# Patient Record
Sex: FEMALE | Race: WHITE | NOT HISPANIC OR LATINO
[De-identification: names, ages, dates, MRNs, and addresses within clinical notes are randomized per-mention and may not be internally consistent; named-entity substitution may affect disease eponyms.]

---

## 2022-02-07 ENCOUNTER — NON-APPOINTMENT (OUTPATIENT)
Age: 57
End: 2022-02-07

## 2022-02-28 PROBLEM — Z00.00 ENCOUNTER FOR PREVENTIVE HEALTH EXAMINATION: Status: ACTIVE | Noted: 2022-02-28

## 2022-03-11 ENCOUNTER — APPOINTMENT (OUTPATIENT)
Dept: ORTHOPEDIC SURGERY | Facility: CLINIC | Age: 57
End: 2022-03-11
Payer: MEDICAID

## 2022-03-11 ENCOUNTER — NON-APPOINTMENT (OUTPATIENT)
Age: 57
End: 2022-03-11

## 2022-03-11 VITALS
SYSTOLIC BLOOD PRESSURE: 166 MMHG | HEIGHT: 64 IN | BODY MASS INDEX: 31.58 KG/M2 | DIASTOLIC BLOOD PRESSURE: 99 MMHG | HEART RATE: 76 BPM | WEIGHT: 185 LBS

## 2022-03-11 DIAGNOSIS — Z82.3 FAMILY HISTORY OF STROKE: ICD-10-CM

## 2022-03-11 DIAGNOSIS — Z86.79 PERSONAL HISTORY OF OTHER DISEASES OF THE CIRCULATORY SYSTEM: ICD-10-CM

## 2022-03-11 DIAGNOSIS — Z82.49 FAMILY HISTORY OF ISCHEMIC HEART DISEASE AND OTHER DISEASES OF THE CIRCULATORY SYSTEM: ICD-10-CM

## 2022-03-11 PROCEDURE — 99204 OFFICE O/P NEW MOD 45 MIN: CPT | Mod: 25

## 2022-03-11 PROCEDURE — 20610 DRAIN/INJ JOINT/BURSA W/O US: CPT | Mod: RT

## 2022-03-11 PROCEDURE — 73562 X-RAY EXAM OF KNEE 3: CPT | Mod: RT

## 2022-03-11 NOTE — HISTORY OF PRESENT ILLNESS
[Pain Location] : pain [8] : a current pain level of 8/10 [3] : a minimum pain level of 3/10 [9] : a maximum pain level of 9/10 [Constant] : ~He/She~ states the symptoms seem to be constant [Bending] : worsened by bending [Walking] : worsened by walking [Acetaminophen] : relieved by acetaminophen [NSAIDs] : relieved by nonsteroidal anti-inflammatory drugs [Rest] : relieved by rest [de-identified] : 55 y/o F presents with right knee pain. She has a lot of pain with walking and stairs. The pain is in the medial knee. She is ambulating without any assistive devices. She never had treatment for the knees. She has constant achy cramping pain. She is taking Tylenol for pain and tries to avoid taking NSAIDs. She has a hx of HTN. She is also having some left knee pain, but it is not as severe.

## 2022-03-11 NOTE — DISCUSSION/SUMMARY
[Medication Risks Reviewed] : Medication risks reviewed [Surgical risks reviewed] : Surgical risks reviewed [de-identified] : 55 y/o F with bone on bone medial compartmental osteoarthritis of the right knee. Conservative therapy and surgical options discussed in detail with the patient. Based on imaging, she is a candidate for a right TKA. She has not had any formal treatment for the knee and encourage her to start with injections before pursuing surgery. She agrees and opted for a right knee cortisone injection today which she tolerated well. F/u with us in three months for repeat cortisone injections if needed. \par \par The patient is a 56 year individual with end stage arthritis of their right knee joint. Based upon the patient's continued symptoms and failure to respond to conservative treatment I have recommended a right total knee arthroplasty for this patient. A long discussion took place with the patient describing what a total joint replacement is and what the procedure would entail. A total knee arthroplasty model, similar to the implant that was used during the operation, was utilized to demonstrate and to discuss the various bearing surfaces of the implants. The hospitalization and post-operative care and rehabilitation were also discussed. The use of perioperative antibiotics and DVT prophylaxis were discussed. The risk, benefits and alternatives to a surgical intervention were discussed at length with the patient. The patient was also advised of risks related to the medical comorbidities, elevated body mass index (BMI), and smoking where applicable. We discussed how to reduce modifiable risk factors and encouraged smoking cessation were applicable.. A lengthy discussion took place to review the most common complications including but not limited to: deep vein thrombosis, pulmonary embolus, heart attack, stroke, infection, wound breakdown, numbness, damage to nerves, tendon, muscles, arteries or other blood vessels, death and other possible complications from anesthesia. The patient was told that we will take steps to minimize these risks by using sterile technique, antibiotics and DVT prophylaxis when appropriate and follow the patient postoperatively in the office setting to monitor progress. The possibility of recurrent pain, no improvement in pain and actual worsening of pain were also discussed with the patient.\par The discharge plan of care focused on the patient going home following surgery. The patient was encouraged to make the necessary arrangements to have someone stay with them when they are discharged home. Following discharge, a home care nurse was to the patient. The home care nurse would open the patient’s home care case and request home physical therapy services. Home physical therapy was to commence following discharge provided it was appropriate and covered by the health insurance benefit plan. \par The benefits of surgery were discussed with the patient including the potential for improving her current clinical condition through operative intervention. Alternatives to surgical intervention including continued conservative management were also discussed in detail. All questions were answered to the satisfaction of the patient. The treatment plan of care, as well as a model of a total knee arthroplasty equivalent to the one that will be used for their total joint replacement, was shared with the patient. The patient agreed to the plan of care as well as the use of implants in their total joint replacement.

## 2022-03-11 NOTE — PROCEDURE
[de-identified] : I injected the patient's right knee today with cortisone for primary osteoarthritis.\par \par I discussed at length with the patient the planned steroid and lidocaine injection. The risks, benefits, convalescence and alternatives were reviewed. The possible side effects discussed included but were not limited to: pain, swelling, heat, bleeding, and redness. Symptoms are generally mild but if they are extensive then contact the office. Giving pain relievers by mouth such as NSAIDs or Tylenol can generally treat the reactions to steroid and lidocaine. Rare cases of infection have been noted. Rash, hives and itching may occur post injection. If you have muscle pain or cramps, flushing and or swelling of the face, rapid heart beat, nausea, dizziness, fever, chills, headache, difficulty breathing, swelling in the arms or legs, or have a prickly feeling of your skin, contact a health care provider immediately. Following this discussion, the knee was prepped with Alcohol and under sterile condition the 80 mg Depo-Medrol and 6 cc Lidocaine injection was performed with a 20 gauge needle through a superolateral injection site. The needle was introduced into the joint, aspiration was performed to ensure intra-articular placement and the medication was injected. Upon withdrawal of the needle the site was cleaned with alcohol and a band aid applied. The patient tolerated the injection well and there were no adverse effects. Post injection instructions included no strenuous activity for 24 hours, cryotherapy and if there are any adverse effects to contact the office.

## 2022-03-11 NOTE — END OF VISIT
[FreeTextEntry3] : I, Nabil Lobo, acted solely as a scribe for Dr. Jeffery Saxena on this date 03/11/2022.

## 2022-03-11 NOTE — PHYSICAL EXAM
[LE] : Sensory: Intact in bilateral lower extremities [ALL] : dorsalis pedis, posterior tibial, femoral, popliteal, and radial 2+ and symmetric bilaterally [Normal] : Alert and in no acute distress [Poor Appearance] : well-appearing [de-identified] : GENERAL APPEARANCE: Well nourished and hydrated, pleasant, alert, and oriented x 3. Appears their stated age. \par HEENT: Normocephalic, extraocular eye motion intact. Nasal septum midline. Oral cavity clear. External auditory canal clear. \par RESPIRATORY: Breath sounds clear and audible in all lobes. No wheezing, No accessory muscle use.\par CARDIOVASCULAR: No apparent abnormalities. No lower leg edema. No varicosities. Pedal pulses are palpable.\par NEUROLOGIC: Sensation is normal, no muscle weakness in the upper or lower extremities.\par DERMATOLOGIC: No apparent skin lesions, moist, warm, no rash.\par SPINE: Cervical spine appears normal and moves freely; thoracic spine appears normal and moves freely; lumbosacral spine appears normal and moves freely, normal, nontender.\par MUSCULOSKELETAL: Hands, wrists, and elbows are normal and move freely, shoulders are normal and move freely.  [de-identified] : Right knee exam shows medial joint line tenderness, ROM 0-95 degrees, slight effusion [de-identified] : 3V xray of the right done in the office today and reviewed by Dr. Jeffery Saxena demonstrates bone on bone medial compartmental osteoarthritis \par \par Xrays of the bilateral knees performed at [facility] on 2/5/2022 showed the following:\par 1. There are moderate to marked osteoarthritic degenerative changes.\par 2. Calcified joint bodies.

## 2022-06-15 ENCOUNTER — APPOINTMENT (OUTPATIENT)
Dept: ORTHOPEDIC SURGERY | Facility: CLINIC | Age: 57
End: 2022-06-15
Payer: MEDICAID

## 2022-06-15 VITALS
DIASTOLIC BLOOD PRESSURE: 91 MMHG | WEIGHT: 185 LBS | HEART RATE: 69 BPM | SYSTOLIC BLOOD PRESSURE: 165 MMHG | BODY MASS INDEX: 31.58 KG/M2 | HEIGHT: 64 IN

## 2022-06-15 DIAGNOSIS — M17.0 BILATERAL PRIMARY OSTEOARTHRITIS OF KNEE: ICD-10-CM

## 2022-06-15 PROCEDURE — 20610 DRAIN/INJ JOINT/BURSA W/O US: CPT | Mod: RT

## 2022-06-15 PROCEDURE — 99214 OFFICE O/P EST MOD 30 MIN: CPT | Mod: 25

## 2022-06-15 PROCEDURE — 73562 X-RAY EXAM OF KNEE 3: CPT | Mod: LT

## 2022-06-15 NOTE — PROCEDURE
[de-identified] : I injected the patient's right knee today with cortisone for primary osteoarthritis.\par \par I discussed at length with the patient the planned steroid and lidocaine injection. The risks, benefits, convalescence and alternatives were reviewed. The possible side effects discussed included but were not limited to: pain, swelling, heat, bleeding, and redness. Symptoms are generally mild but if they are extensive then contact the office. Giving pain relievers by mouth such as NSAIDs or Tylenol can generally treat the reactions to steroid and lidocaine. Rare cases of infection have been noted. Rash, hives and itching may occur post injection. If you have muscle pain or cramps, flushing and or swelling of the face, rapid heart beat, nausea, dizziness, fever, chills, headache, difficulty breathing, swelling in the arms or legs, or have a prickly feeling of your skin, contact a health care provider immediately. Following this discussion, the knee was prepped with Alcohol and under sterile condition the 80 mg Depo-Medrol and 6 cc Lidocaine injection was performed with a 20 gauge needle through a superolateral injection site. The needle was introduced into the joint, aspiration was performed to ensure intra-articular placement and the medication was injected. Upon withdrawal of the needle the site was cleaned with alcohol and a band aid applied. The patient tolerated the injection well and there were no adverse effects. Post injection instructions included no strenuous activity for 24 hours, cryotherapy and if there are any adverse effects to contact the office.

## 2022-06-15 NOTE — PHYSICAL EXAM
[LE] : Sensory: Intact in bilateral lower extremities [ALL] : dorsalis pedis, posterior tibial, femoral, popliteal, and radial 2+ and symmetric bilaterally [Normal] : Alert and in no acute distress [Antalgic] : antalgic [Poor Appearance] : well-appearing [de-identified] : GENERAL APPEARANCE: Well nourished and hydrated, pleasant, alert, and oriented x 3. Appears their stated age. \par HEENT: Normocephalic, extraocular eye motion intact. Nasal septum midline. Oral cavity clear. External auditory canal clear. \par RESPIRATORY: Breath sounds clear and audible in all lobes. No wheezing, No accessory muscle use.\par CARDIOVASCULAR: No apparent abnormalities. No lower leg edema. No varicosities. Pedal pulses are palpable.\par NEUROLOGIC: Sensation is normal, no muscle weakness in the upper or lower extremities.\par DERMATOLOGIC: No apparent skin lesions, moist, warm, no rash.\par SPINE: Cervical spine appears normal and moves freely; thoracic spine appears normal and moves freely; lumbosacral spine appears normal and moves freely, normal, nontender.\par MUSCULOSKELETAL: Hands, wrists, and elbows are normal and move freely, shoulders are normal and move freely.  [de-identified] : b/l knee exam shows medial joint line tenderness, slight effusion\par Left knee ROM 0-100 degrees\par Right knee ROM 5-100 degrees,  [de-identified] : 3V xray of the left knee done in the office today and reviewed by Dr. Jeffery Saxena demonstrates bone on bone medial compartmental osteoarthritis

## 2022-06-15 NOTE — END OF VISIT
[FreeTextEntry3] : I, Nabil Lobo, acted solely as a scribe for Dr. Jeffery Saxena on this date 06/15/2022.

## 2022-06-15 NOTE — DISCUSSION/SUMMARY
[Medication Risks Reviewed] : Medication risks reviewed [Surgical risks reviewed] : Surgical risks reviewed [de-identified] : 58 y/o F with bone on bone medial compartmental osteoarthritis of both knees. We discussed the nature of the condition and the treatment options. The patient is a candidate for a staged bilateral TKA. She has more pain in the right knee than in the left knee and is interested in pursuing the right TKA in the fall. Although she did not have a lot of relief with the last right knee cortisone injection, but she would like to try it again. She defers a left knee cortisone injection. The surgery was discussed in detail and she started the scheduling process today. All questions were answered. \par \par The patient is a 57 year individual with end stage arthritis of their right knee joint. Based upon the patient's continued symptoms and failure to respond to conservative treatment I have recommended a right total knee arthroplasty for this patient. A long discussion took place with the patient describing what a total joint replacement is and what the procedure would entail. A total knee arthroplasty model, similar to the implant that was used during the operation, was utilized to demonstrate and to discuss the various bearing surfaces of the implants. The hospitalization and post-operative care and rehabilitation were also discussed. The use of perioperative antibiotics and DVT prophylaxis were discussed. The risk, benefits and alternatives to a surgical intervention were discussed at length with the patient. The patient was also advised of risks related to the medical comorbidities, elevated body mass index (BMI), and smoking where applicable. We discussed how to reduce modifiable risk factors and encouraged smoking cessation were applicable.. A lengthy discussion took place to review the most common complications including but not limited to: deep vein thrombosis, pulmonary embolus, heart attack, stroke, infection, wound breakdown, numbness, damage to nerves, tendon, muscles, arteries or other blood vessels, death and other possible complications from anesthesia. The patient was told that we will take steps to minimize these risks by using sterile technique, antibiotics and DVT prophylaxis when appropriate and follow the patient postoperatively in the office setting to monitor progress. The possibility of recurrent pain, no improvement in pain and actual worsening of pain were also discussed with the patient.\par The discharge plan of care focused on the patient going home following surgery. The patient was encouraged to make the necessary arrangements to have someone stay with them when they are discharged home. Following discharge, a home care nurse was to the patient. The home care nurse would open the patient’s home care case and request home physical therapy services. Home physical therapy was to commence following discharge provided it was appropriate and covered by the health insurance benefit plan. \par The benefits of surgery were discussed with the patient including the potential for improving her current clinical condition through operative intervention. Alternatives to surgical intervention including continued conservative management were also discussed in detail. All questions were answered to the satisfaction of the patient. The treatment plan of care, as well as a model of a total knee arthroplasty equivalent to the one that will be used for their total joint replacement, was shared with the patient. The patient agreed to the plan of care as well as the use of implants in their total joint replacement.

## 2022-06-15 NOTE — HISTORY OF PRESENT ILLNESS
[Pain Location] : pain [8] : a current pain level of 8/10 [3] : a minimum pain level of 3/10 [9] : a maximum pain level of 9/10 [Constant] : ~He/She~ states the symptoms seem to be constant [Bending] : worsened by bending [Walking] : worsened by walking [Acetaminophen] : relieved by acetaminophen [NSAIDs] : relieved by nonsteroidal anti-inflammatory drugs [Rest] : relieved by rest [Worsening] : worsening [___ yrs] : [unfilled] year(s) ago [de-identified] : 56 y/o F presents with right knee pain. The pain is in the medial knee and has a known hx of bone on bone medial compartmental osteoarthritis of the right knee. Her pain is exacerbated with walking and stairs. She had a cortisone injection at her last appointment which did not provide significant relief. She has constant achy cramping pain. She is taking Tylenol for pain and tries to avoid taking NSAIDs. She has a hx of HTN. She is also having left knee pain, but it is not as severe as the right knee.

## 2022-09-19 ENCOUNTER — OUTPATIENT (OUTPATIENT)
Dept: OUTPATIENT SERVICES | Facility: HOSPITAL | Age: 57
LOS: 1 days | End: 2022-09-19
Payer: MEDICAID

## 2022-09-19 VITALS
SYSTOLIC BLOOD PRESSURE: 140 MMHG | TEMPERATURE: 97 F | WEIGHT: 194.01 LBS | DIASTOLIC BLOOD PRESSURE: 100 MMHG | HEIGHT: 64 IN | RESPIRATION RATE: 16 BRPM | HEART RATE: 97 BPM | OXYGEN SATURATION: 97 %

## 2022-09-19 DIAGNOSIS — M17.11 UNILATERAL PRIMARY OSTEOARTHRITIS, RIGHT KNEE: ICD-10-CM

## 2022-09-19 DIAGNOSIS — Z29.9 ENCOUNTER FOR PROPHYLACTIC MEASURES, UNSPECIFIED: ICD-10-CM

## 2022-09-19 DIAGNOSIS — Z01.818 ENCOUNTER FOR OTHER PREPROCEDURAL EXAMINATION: ICD-10-CM

## 2022-09-19 DIAGNOSIS — Z90.49 ACQUIRED ABSENCE OF OTHER SPECIFIED PARTS OF DIGESTIVE TRACT: Chronic | ICD-10-CM

## 2022-09-19 LAB
A1C WITH ESTIMATED AVERAGE GLUCOSE RESULT: 6 % — HIGH (ref 4–5.6)
ALBUMIN SERPL ELPH-MCNC: 4.2 G/DL — SIGNIFICANT CHANGE UP (ref 3.3–5.2)
ALP SERPL-CCNC: 84 U/L — SIGNIFICANT CHANGE UP (ref 40–120)
ALT FLD-CCNC: 13 U/L — SIGNIFICANT CHANGE UP
ANION GAP SERPL CALC-SCNC: 14 MMOL/L — SIGNIFICANT CHANGE UP (ref 5–17)
APTT BLD: 34.5 SEC — SIGNIFICANT CHANGE UP (ref 27.5–35.5)
AST SERPL-CCNC: 13 U/L — SIGNIFICANT CHANGE UP
BASOPHILS # BLD AUTO: 0.04 K/UL — SIGNIFICANT CHANGE UP (ref 0–0.2)
BASOPHILS NFR BLD AUTO: 0.4 % — SIGNIFICANT CHANGE UP (ref 0–2)
BILIRUB SERPL-MCNC: 0.3 MG/DL — LOW (ref 0.4–2)
BLD GP AB SCN SERPL QL: SIGNIFICANT CHANGE UP
BUN SERPL-MCNC: 14.8 MG/DL — SIGNIFICANT CHANGE UP (ref 8–20)
CALCIUM SERPL-MCNC: 9.4 MG/DL — SIGNIFICANT CHANGE UP (ref 8.4–10.5)
CHLORIDE SERPL-SCNC: 103 MMOL/L — SIGNIFICANT CHANGE UP (ref 98–107)
CO2 SERPL-SCNC: 27 MMOL/L — SIGNIFICANT CHANGE UP (ref 22–29)
CREAT SERPL-MCNC: 0.85 MG/DL — SIGNIFICANT CHANGE UP (ref 0.5–1.3)
EGFR: 79 ML/MIN/1.73M2 — SIGNIFICANT CHANGE UP
EOSINOPHIL # BLD AUTO: 0.14 K/UL — SIGNIFICANT CHANGE UP (ref 0–0.5)
EOSINOPHIL NFR BLD AUTO: 1.5 % — SIGNIFICANT CHANGE UP (ref 0–6)
ESTIMATED AVERAGE GLUCOSE: 126 MG/DL — HIGH (ref 68–114)
GLUCOSE SERPL-MCNC: 162 MG/DL — HIGH (ref 70–99)
HCT VFR BLD CALC: 41.8 % — SIGNIFICANT CHANGE UP (ref 34.5–45)
HGB BLD-MCNC: 13.9 G/DL — SIGNIFICANT CHANGE UP (ref 11.5–15.5)
IMM GRANULOCYTES NFR BLD AUTO: 0.3 % — SIGNIFICANT CHANGE UP (ref 0–0.9)
INR BLD: 1.2 RATIO — HIGH (ref 0.88–1.16)
LYMPHOCYTES # BLD AUTO: 2.2 K/UL — SIGNIFICANT CHANGE UP (ref 1–3.3)
LYMPHOCYTES # BLD AUTO: 23.5 % — SIGNIFICANT CHANGE UP (ref 13–44)
MCHC RBC-ENTMCNC: 29.3 PG — SIGNIFICANT CHANGE UP (ref 27–34)
MCHC RBC-ENTMCNC: 33.3 GM/DL — SIGNIFICANT CHANGE UP (ref 32–36)
MCV RBC AUTO: 88 FL — SIGNIFICANT CHANGE UP (ref 80–100)
MONOCYTES # BLD AUTO: 0.54 K/UL — SIGNIFICANT CHANGE UP (ref 0–0.9)
MONOCYTES NFR BLD AUTO: 5.8 % — SIGNIFICANT CHANGE UP (ref 2–14)
NEUTROPHILS # BLD AUTO: 6.41 K/UL — SIGNIFICANT CHANGE UP (ref 1.8–7.4)
NEUTROPHILS NFR BLD AUTO: 68.5 % — SIGNIFICANT CHANGE UP (ref 43–77)
PLATELET # BLD AUTO: 372 K/UL — SIGNIFICANT CHANGE UP (ref 150–400)
POTASSIUM SERPL-MCNC: 3.5 MMOL/L — SIGNIFICANT CHANGE UP (ref 3.5–5.3)
POTASSIUM SERPL-SCNC: 3.5 MMOL/L — SIGNIFICANT CHANGE UP (ref 3.5–5.3)
PROT SERPL-MCNC: 7.6 G/DL — SIGNIFICANT CHANGE UP (ref 6.6–8.7)
PROTHROM AB SERPL-ACNC: 14 SEC — HIGH (ref 10.5–13.4)
RBC # BLD: 4.75 M/UL — SIGNIFICANT CHANGE UP (ref 3.8–5.2)
RBC # FLD: 13.2 % — SIGNIFICANT CHANGE UP (ref 10.3–14.5)
SODIUM SERPL-SCNC: 144 MMOL/L — SIGNIFICANT CHANGE UP (ref 135–145)
WBC # BLD: 9.36 K/UL — SIGNIFICANT CHANGE UP (ref 3.8–10.5)
WBC # FLD AUTO: 9.36 K/UL — SIGNIFICANT CHANGE UP (ref 3.8–10.5)

## 2022-09-19 PROCEDURE — 93005 ELECTROCARDIOGRAM TRACING: CPT

## 2022-09-19 PROCEDURE — G0463: CPT

## 2022-09-19 PROCEDURE — 93010 ELECTROCARDIOGRAM REPORT: CPT

## 2022-09-19 NOTE — H&P PST ADULT - NEUROLOGICAL
negative normal/cranial nerves II-XII intact/sensation intact/responds to verbal commands/cranial nerves intact/no spontaneous movement

## 2022-09-19 NOTE — H&P PST ADULT - MUSCULOSKELETAL
right knee/no calf tenderness/decreased ROM due to pain/strength 5/5 bilateral upper extremities/strength 5/5 bilateral lower extremities/abnormal gait details…

## 2022-09-19 NOTE — H&P PST ADULT - GASTROINTESTINAL
negative normal/soft/nontender/nondistended/normal active bowel sounds normal/soft/nontender/nondistended/normal active bowel sounds/no guarding/no masses palpable

## 2022-09-19 NOTE — H&P PST ADULT - HISTORY OF PRESENT ILLNESS
56 yo F PMH of HTN presents with c/o right knee pain. She has a known Hx of bone on bone medial compartmental osteoarthritis of the right knee. Her pain is exacerbated with walking and stairs. She had a cortisone injection which did not provide significant relief. She has constant achy cramping pain. She is taking Tylenol for pain and avoids NSAIDs. She is also having left knee pain, but it is not as severe as the right knee. She states the symptoms are worsening. She mentions her symptoms started years ago. Pain levels include a current pain level of 8/10, a minimum pain level of 3/10 and a maximum pain level of 9/10. She states the symptoms seem to be constant. Modifying factors: worsened by bending and walking. Relieving factors include acetaminophen and rest. Preop assessment prior to Right TKR w/Dr Saxena scheduled for 10/6/2022

## 2022-09-19 NOTE — H&P PST ADULT - ASSESSMENT
56 yo F with end stage arthritis of her right knee joint, worsening symptoms and failure to respond to conservative treatment. Preop assessment prior to right total knee replacement w/Dr Saxena scheduled for 10/6/2022    Written and oral ERP instructions given, pt verbalized understanding.     Pt was educated on preop preparation with written and verbal instructions. Pt was informed to obtain clearances >3 days before surgery. Pt will review medications with PCP. Pt was educated on NSAIDs, multivitamins and herbals that increase the risk of bleeding and need to be stopped 7 days before procedure. Pt was educated on covid testing and covid prevention, i.e. social distancing, handwashing, mask wearing. Pt verbalized understanding of the above.    OPIOID RISK TOOL    ANDRA EACH BOX THAT APPLIES AND ADD TOTALS AT THE END    FAMILY HISTORY OF SUBSTANCE ABUSE                 FEMALE         MALE                                                Alcohol                             [  ]1 pt          [  ]3pts                                               Illegal Durgs                     [  ]2 pts        [  ]3pts                                               Rx Drugs                           [  ]4 pts        [  ]4 pts    PERSONAL HISTORY OF SUBSTANCE ABUSE                                                                                          Alcohol                             [  ]3 pts       [  ]3 pts                                               Illegal Drugs                     [  ]4 pts        [  ]4 pts                                               Rx Drugs                           [  ]5 pts        [  ]5 pts    AGE BETWEEN 16-45 YEARS                                      [  ]1 pt         [  ]1 pt    HISTORY OF PREADOLESCENT   SEXUAL ABUSE                                                             [  ]3 pts        [  ]0pts    PSYCHOLOGICAL DISEASE                     ADD, OCD, Bipolar, Schizophrenia        [  ]2 pts         [  ]2 pts                      Depression                                               [  ]1 pt           [  ]1 pt           SCORING TOTAL   (add numbers and type here)              ( 0 )                                     A score of 3 or lower indicated LOW risk for future opioid abuse  A score of 4 to 7 indicated moderate risk for future opioid abuse  A score of 8 or higher indicates a high risk for opioid abuse    CAPRINI VTE 2.0 SCORE [CLOT updated 2019]    AGE RELATED RISK FACTORS                                                       MOBILITY RELATED FACTORS  [ x] Age 41-60 years                                            (1 Point)                    [ ] Bed rest                                                        (1 Point)  [ ] Age: 61-74 years                                           (2 Points)                  [ ] Plaster cast                                                   (2 Points)  [ ] Age= 75 years                                              (3 Points)                    [ ] Bed bound for more than 72 hours                 (2 Points)    DISEASE RELATED RISK FACTORS                                               GENDER SPECIFIC FACTORS  [ ] Edema in the lower extremities                       (1 Point)              [ ] Pregnancy                                                     (1 Point)  [ ] Varicose veins                                               (1 Point)                     [ ] Post-partum < 6 weeks                                   (1 Point)             [x ] BMI > 25 Kg/m2                                            (1 Point)                     [ ] Hormonal therapy  or oral contraception          (1 Point)                 [ ] Sepsis (in the previous month)                        (1 Point)               [ ] History of pregnancy complications                 (1 point)  [ ] Pneumonia or serious lung disease                                               [ ] Unexplained or recurrent                     (1 Point)           (in the previous month)                               (1 Point)  [ ] Abnormal pulmonary function test                     (1 Point)                 SURGERY RELATED RISK FACTORS  [ ] Acute myocardial infarction                              (1 Point)               [ ]  Section                                             (1 Point)  [ ] Congestive heart failure (in the previous month)  (1 Point)      [ ] Minor surgery                                                  (1 Point)   [ ] Inflammatory bowel disease                             (1 Point)               [ ] Arthroscopic surgery                                        (2 Points)  [ ] Central venous access                                      (2 Points)                [ ] General surgery lasting more than 45 minutes (2 points)  [ ] Malignancy- Present or previous                   (2 Points)                [x ] Elective arthroplasty                                         (5 points)    [ ] Stroke (in the previous month)                          (5 Points)                                                                                                                                                           HEMATOLOGY RELATED FACTORS                                                 TRAUMA RELATED RISK FACTORS  [ ] Prior episodes of VTE                                     (3 Points)                [ ] Fracture of the hip, pelvis, or leg                       (5 Points)  [ ] Positive family history for VTE                         (3 Points)             [ ] Acute spinal cord injury (in the previous month)  (5 Points)  [ ] Prothrombin 50854 A                                     (3 Points)               [ ] Paralysis  (less than 1 month)                             (5 Points)  [ ] Factor V Leiden                                             (3 Points)                  [ ] Multiple Trauma within 1 month                        (5 Points)  [ ] Lupus anticoagulants                                     (3 Points)                                                           [ ] Anticardiolipin antibodies                               (3 Points)                                                       [ ] High homocysteine in the blood                      (3 Points)                                             [ ] Other congenital or acquired thrombophilia      (3 Points)                                                [ ] Heparin induced thrombocytopenia                  (3 Points)                                     Total Score [     7     ]

## 2022-09-19 NOTE — H&P PST ADULT - PSYCHIATRIC
normal/normal affect/alert and oriented x3/normal behavior negative normal affect/alert and oriented x3/normal behavior

## 2022-09-19 NOTE — H&P PST ADULT - NSICDXFAMILYHX_GEN_ALL_CORE_FT
FAMILY HISTORY:  Father  Still living? Unknown  FH: heart disease, Age at diagnosis: Age Unknown    Sibling  Still living? Unknown  Family history of neurofibromatosis, Age at diagnosis: Age Unknown

## 2022-09-19 NOTE — H&P PST ADULT - PROBLEM SELECTOR PLAN 1
preop assessment, medical clearance pending, right TKR scheduled for 10/6/2022 preop assessment, medical and cardiac clearance pending, right TKR scheduled for 10/6/2022 preop assessment, medical and cardiac clearances pending, right TKR scheduled for 10/6/2022

## 2022-09-19 NOTE — H&P PST ADULT - NSICDXPASTSURGICALHX_GEN_ALL_CORE_FT
PAST SURGICAL HISTORY:  History of cholecystectomy      PAST SURGICAL HISTORY:  No significant past surgical history

## 2022-09-19 NOTE — H&P PST ADULT - CARDIOVASCULAR
normal/regular rate and rhythm/S1 S2 present/no gallops/no rub/no murmur/no JVD/no pedal edema negative details…

## 2022-09-20 LAB
MRSA PCR RESULT.: SIGNIFICANT CHANGE UP
S AUREUS DNA NOSE QL NAA+PROBE: SIGNIFICANT CHANGE UP

## 2022-10-05 ENCOUNTER — FORM ENCOUNTER (OUTPATIENT)
Age: 57
End: 2022-10-05

## 2022-10-05 ENCOUNTER — TRANSCRIPTION ENCOUNTER (OUTPATIENT)
Age: 57
End: 2022-10-05

## 2022-10-05 NOTE — PATIENT PROFILE ADULT - FALL HARM RISK - HARM RISK INTERVENTIONS
Assistance with ambulation/Assistance OOB with selected safe patient handling equipment/Communicate Risk of Fall with Harm to all staff/Discuss with provider need for PT consult/Monitor gait and stability/Provide patient with walking aids - walker, cane, crutches/Reinforce activity limits and safety measures with patient and family/Sit up slowly, dangle for a short time, stand at bedside before walking/Tailored Fall Risk Interventions/Use of alarms - bed, chair and/or voice tab/Visual Cue: Yellow wristband and red socks/Bed in lowest position, wheels locked, appropriate side rails in place/Call bell, personal items and telephone in reach/Instruct patient to call for assistance before getting out of bed or chair/Non-slip footwear when patient is out of bed/[place] to call system/Physically safe environment - no spills, clutter or unnecessary equipment/Purposeful Proactive Rounding/Room/bathroom lighting operational, light cord in reach

## 2022-10-05 NOTE — PATIENT PROFILE ADULT - NSPREOP1_CHKDIETFULLFLUIDS_GEN_A_NUR
Patient would like communication of their results via:      Home Phone: [phone] (home)  Okay to leave a message containing results? Yes     n/a

## 2022-10-06 ENCOUNTER — TRANSCRIPTION ENCOUNTER (OUTPATIENT)
Age: 57
End: 2022-10-06

## 2022-10-06 ENCOUNTER — APPOINTMENT (OUTPATIENT)
Dept: ORTHOPEDIC SURGERY | Facility: HOSPITAL | Age: 57
End: 2022-10-06

## 2022-10-06 ENCOUNTER — INPATIENT (INPATIENT)
Facility: HOSPITAL | Age: 57
LOS: 0 days | Discharge: HOME | DRG: 470 | End: 2022-10-07
Attending: ORTHOPAEDIC SURGERY | Admitting: ORTHOPAEDIC SURGERY
Payer: MEDICAID

## 2022-10-06 VITALS
TEMPERATURE: 97 F | SYSTOLIC BLOOD PRESSURE: 152 MMHG | RESPIRATION RATE: 16 BRPM | DIASTOLIC BLOOD PRESSURE: 51 MMHG | WEIGHT: 194.01 LBS | HEIGHT: 64 IN | OXYGEN SATURATION: 96 % | HEART RATE: 92 BPM

## 2022-10-06 DIAGNOSIS — I10 ESSENTIAL (PRIMARY) HYPERTENSION: ICD-10-CM

## 2022-10-06 DIAGNOSIS — R73.03 PREDIABETES: ICD-10-CM

## 2022-10-06 DIAGNOSIS — M17.11 UNILATERAL PRIMARY OSTEOARTHRITIS, RIGHT KNEE: ICD-10-CM

## 2022-10-06 LAB
ABO RH CONFIRMATION: SIGNIFICANT CHANGE UP
GLUCOSE BLDC GLUCOMTR-MCNC: 101 MG/DL — HIGH (ref 70–99)
GLUCOSE BLDC GLUCOMTR-MCNC: 117 MG/DL — HIGH (ref 70–99)
GLUCOSE BLDC GLUCOMTR-MCNC: 149 MG/DL — HIGH (ref 70–99)

## 2022-10-06 PROCEDURE — 20985 CPTR-ASST DIR MS PX: CPT

## 2022-10-06 PROCEDURE — 99222 1ST HOSP IP/OBS MODERATE 55: CPT

## 2022-10-06 PROCEDURE — 27447 TOTAL KNEE ARTHROPLASTY: CPT | Mod: AS,RT

## 2022-10-06 PROCEDURE — 27447 TOTAL KNEE ARTHROPLASTY: CPT | Mod: RT

## 2022-10-06 PROCEDURE — 73560 X-RAY EXAM OF KNEE 1 OR 2: CPT | Mod: 26,RT

## 2022-10-06 DEVICE — COMP PATELLA TRI-PEG E-PLUS POLY 8X32MM: Type: IMPLANTABLE DEVICE | Status: FUNCTIONAL

## 2022-10-06 DEVICE — COMP FEM NON POROUS SZ 7 RT: Type: IMPLANTABLE DEVICE | Status: FUNCTIONAL

## 2022-10-06 DEVICE — INSERT TIB EMPOWR SZ 6 12MM: Type: IMPLANTABLE DEVICE | Status: FUNCTIONAL

## 2022-10-06 DEVICE — SCREW PSN FEMALE 2.5X25MM: Type: IMPLANTABLE DEVICE | Status: FUNCTIONAL

## 2022-10-06 DEVICE — INSERT TIB NONPOROUS UNIV SZ 6 RT: Type: IMPLANTABLE DEVICE | Status: FUNCTIONAL

## 2022-10-06 DEVICE — PIN THREADED HEADED STRL: Type: IMPLANTABLE DEVICE | Status: FUNCTIONAL

## 2022-10-06 DEVICE — STEM MOD UNIV TIB 12X40MM: Type: IMPLANTABLE DEVICE | Status: FUNCTIONAL

## 2022-10-06 DEVICE — SCREW HD HEX 3.5MM: Type: IMPLANTABLE DEVICE | Status: FUNCTIONAL

## 2022-10-06 DEVICE — CEMENT BONE PALACOS R W/O GENTAMICIN 1X40: Type: IMPLANTABLE DEVICE | Status: FUNCTIONAL

## 2022-10-06 DEVICE — PIN HEADLESS TROCHAR 3.2X75MM: Type: IMPLANTABLE DEVICE | Status: FUNCTIONAL

## 2022-10-06 RX ORDER — MAGNESIUM HYDROXIDE 400 MG/1
30 TABLET, CHEWABLE ORAL DAILY
Refills: 0 | Status: DISCONTINUED | OUTPATIENT
Start: 2022-10-06 | End: 2022-10-07

## 2022-10-06 RX ORDER — ACETAMINOPHEN 500 MG
975 TABLET ORAL EVERY 8 HOURS
Refills: 0 | Status: DISCONTINUED | OUTPATIENT
Start: 2022-10-06 | End: 2022-10-07

## 2022-10-06 RX ORDER — HYDROMORPHONE HYDROCHLORIDE 2 MG/ML
4 INJECTION INTRAMUSCULAR; INTRAVENOUS; SUBCUTANEOUS EVERY 4 HOURS
Refills: 0 | Status: DISCONTINUED | OUTPATIENT
Start: 2022-10-06 | End: 2022-10-07

## 2022-10-06 RX ORDER — ASPIRIN/CALCIUM CARB/MAGNESIUM 324 MG
325 TABLET ORAL
Refills: 0 | Status: DISCONTINUED | OUTPATIENT
Start: 2022-10-06 | End: 2022-10-07

## 2022-10-06 RX ORDER — KETOROLAC TROMETHAMINE 30 MG/ML
15 SYRINGE (ML) INJECTION EVERY 6 HOURS
Refills: 0 | Status: DISCONTINUED | OUTPATIENT
Start: 2022-10-06 | End: 2022-10-07

## 2022-10-06 RX ORDER — CEFAZOLIN SODIUM 1 G
2000 VIAL (EA) INJECTION
Refills: 0 | Status: COMPLETED | OUTPATIENT
Start: 2022-10-06 | End: 2022-10-06

## 2022-10-06 RX ORDER — POLYETHYLENE GLYCOL 3350 17 G/17G
17 POWDER, FOR SOLUTION ORAL AT BEDTIME
Refills: 0 | Status: DISCONTINUED | OUTPATIENT
Start: 2022-10-06 | End: 2022-10-07

## 2022-10-06 RX ORDER — SENNA PLUS 8.6 MG/1
2 TABLET ORAL AT BEDTIME
Refills: 0 | Status: DISCONTINUED | OUTPATIENT
Start: 2022-10-06 | End: 2022-10-07

## 2022-10-06 RX ORDER — CELECOXIB 200 MG/1
200 CAPSULE ORAL EVERY 12 HOURS
Refills: 0 | Status: DISCONTINUED | OUTPATIENT
Start: 2022-10-08 | End: 2022-10-07

## 2022-10-06 RX ORDER — TRANEXAMIC ACID 100 MG/ML
1000 INJECTION, SOLUTION INTRAVENOUS ONCE
Refills: 0 | Status: DISCONTINUED | OUTPATIENT
Start: 2022-10-06 | End: 2022-10-06

## 2022-10-06 RX ORDER — ONDANSETRON 8 MG/1
4 TABLET, FILM COATED ORAL EVERY 6 HOURS
Refills: 0 | Status: DISCONTINUED | OUTPATIENT
Start: 2022-10-06 | End: 2022-10-07

## 2022-10-06 RX ORDER — VALSARTAN 80 MG/1
0 TABLET ORAL
Qty: 0 | Refills: 0 | DISCHARGE

## 2022-10-06 RX ORDER — SODIUM CHLORIDE 9 MG/ML
1000 INJECTION INTRAMUSCULAR; INTRAVENOUS; SUBCUTANEOUS
Refills: 0 | Status: DISCONTINUED | OUTPATIENT
Start: 2022-10-06 | End: 2022-10-07

## 2022-10-06 RX ORDER — OXYCODONE HYDROCHLORIDE 5 MG/1
5 TABLET ORAL
Refills: 0 | Status: DISCONTINUED | OUTPATIENT
Start: 2022-10-06 | End: 2022-10-07

## 2022-10-06 RX ORDER — PANTOPRAZOLE SODIUM 20 MG/1
40 TABLET, DELAYED RELEASE ORAL
Refills: 0 | Status: DISCONTINUED | OUTPATIENT
Start: 2022-10-06 | End: 2022-10-07

## 2022-10-06 RX ORDER — ACETAMINOPHEN 500 MG
650 TABLET ORAL EVERY 6 HOURS
Refills: 0 | Status: DISCONTINUED | OUTPATIENT
Start: 2022-10-06 | End: 2022-10-07

## 2022-10-06 RX ORDER — ONDANSETRON 8 MG/1
4 TABLET, FILM COATED ORAL ONCE
Refills: 0 | Status: DISCONTINUED | OUTPATIENT
Start: 2022-10-06 | End: 2022-10-06

## 2022-10-06 RX ORDER — CEFAZOLIN SODIUM 1 G
2000 VIAL (EA) INJECTION ONCE
Refills: 0 | Status: DISCONTINUED | OUTPATIENT
Start: 2022-10-06 | End: 2022-10-06

## 2022-10-06 RX ORDER — SODIUM CHLORIDE 9 MG/ML
3 INJECTION INTRAMUSCULAR; INTRAVENOUS; SUBCUTANEOUS EVERY 8 HOURS
Refills: 0 | Status: DISCONTINUED | OUTPATIENT
Start: 2022-10-06 | End: 2022-10-06

## 2022-10-06 RX ORDER — CELECOXIB 200 MG/1
400 CAPSULE ORAL ONCE
Refills: 0 | Status: COMPLETED | OUTPATIENT
Start: 2022-10-06 | End: 2022-10-06

## 2022-10-06 RX ORDER — OXYCODONE HYDROCHLORIDE 5 MG/1
10 TABLET ORAL
Refills: 0 | Status: DISCONTINUED | OUTPATIENT
Start: 2022-10-06 | End: 2022-10-07

## 2022-10-06 RX ORDER — APREPITANT 80 MG/1
40 CAPSULE ORAL ONCE
Refills: 0 | Status: COMPLETED | OUTPATIENT
Start: 2022-10-06 | End: 2022-10-06

## 2022-10-06 RX ORDER — FENTANYL CITRATE 50 UG/ML
25 INJECTION INTRAVENOUS
Refills: 0 | Status: DISCONTINUED | OUTPATIENT
Start: 2022-10-06 | End: 2022-10-06

## 2022-10-06 RX ORDER — ACETAMINOPHEN 500 MG
1000 TABLET ORAL ONCE
Refills: 0 | Status: COMPLETED | OUTPATIENT
Start: 2022-10-06 | End: 2022-10-07

## 2022-10-06 RX ORDER — SODIUM CHLORIDE 9 MG/ML
500 INJECTION INTRAMUSCULAR; INTRAVENOUS; SUBCUTANEOUS ONCE
Refills: 0 | Status: COMPLETED | OUTPATIENT
Start: 2022-10-06 | End: 2022-10-06

## 2022-10-06 RX ORDER — ACETAMINOPHEN 500 MG
975 TABLET ORAL ONCE
Refills: 0 | Status: COMPLETED | OUTPATIENT
Start: 2022-10-06 | End: 2022-10-06

## 2022-10-06 RX ORDER — SODIUM CHLORIDE 9 MG/ML
1000 INJECTION, SOLUTION INTRAVENOUS
Refills: 0 | Status: DISCONTINUED | OUTPATIENT
Start: 2022-10-06 | End: 2022-10-06

## 2022-10-06 RX ORDER — VALSARTAN 80 MG/1
160 TABLET ORAL
Refills: 0 | Status: DISCONTINUED | OUTPATIENT
Start: 2022-10-08 | End: 2022-10-07

## 2022-10-06 RX ADMIN — APREPITANT 40 MILLIGRAM(S): 80 CAPSULE ORAL at 06:06

## 2022-10-06 RX ADMIN — Medication 15 MILLIGRAM(S): at 12:10

## 2022-10-06 RX ADMIN — Medication 325 MILLIGRAM(S): at 17:27

## 2022-10-06 RX ADMIN — SODIUM CHLORIDE 100 MILLILITER(S): 9 INJECTION INTRAMUSCULAR; INTRAVENOUS; SUBCUTANEOUS at 10:38

## 2022-10-06 RX ADMIN — SODIUM CHLORIDE 75 MILLILITER(S): 9 INJECTION, SOLUTION INTRAVENOUS at 09:48

## 2022-10-06 RX ADMIN — Medication 975 MILLIGRAM(S): at 12:40

## 2022-10-06 RX ADMIN — SODIUM CHLORIDE 100 MILLILITER(S): 9 INJECTION INTRAMUSCULAR; INTRAVENOUS; SUBCUTANEOUS at 22:20

## 2022-10-06 RX ADMIN — Medication 100 MILLIGRAM(S): at 22:16

## 2022-10-06 RX ADMIN — Medication 975 MILLIGRAM(S): at 22:15

## 2022-10-06 RX ADMIN — OXYCODONE HYDROCHLORIDE 5 MILLIGRAM(S): 5 TABLET ORAL at 17:34

## 2022-10-06 RX ADMIN — Medication 1 TABLET(S): at 12:10

## 2022-10-06 RX ADMIN — OXYCODONE HYDROCHLORIDE 10 MILLIGRAM(S): 5 TABLET ORAL at 22:15

## 2022-10-06 RX ADMIN — Medication 975 MILLIGRAM(S): at 12:10

## 2022-10-06 RX ADMIN — SODIUM CHLORIDE 100 MILLILITER(S): 9 INJECTION INTRAMUSCULAR; INTRAVENOUS; SUBCUTANEOUS at 12:09

## 2022-10-06 RX ADMIN — Medication 15 MILLIGRAM(S): at 12:25

## 2022-10-06 RX ADMIN — OXYCODONE HYDROCHLORIDE 5 MILLIGRAM(S): 5 TABLET ORAL at 14:08

## 2022-10-06 RX ADMIN — Medication 100 MILLIGRAM(S): at 14:08

## 2022-10-06 RX ADMIN — SODIUM CHLORIDE 500 MILLILITER(S): 9 INJECTION INTRAMUSCULAR; INTRAVENOUS; SUBCUTANEOUS at 09:46

## 2022-10-06 RX ADMIN — OXYCODONE HYDROCHLORIDE 5 MILLIGRAM(S): 5 TABLET ORAL at 15:01

## 2022-10-06 RX ADMIN — OXYCODONE HYDROCHLORIDE 10 MILLIGRAM(S): 5 TABLET ORAL at 23:15

## 2022-10-06 RX ADMIN — Medication 15 MILLIGRAM(S): at 23:45

## 2022-10-06 RX ADMIN — Medication 975 MILLIGRAM(S): at 06:06

## 2022-10-06 RX ADMIN — Medication 100 MILLIGRAM(S): at 07:15

## 2022-10-06 RX ADMIN — CELECOXIB 400 MILLIGRAM(S): 200 CAPSULE ORAL at 06:06

## 2022-10-06 RX ADMIN — Medication 15 MILLIGRAM(S): at 17:28

## 2022-10-06 RX ADMIN — Medication 975 MILLIGRAM(S): at 23:15

## 2022-10-06 NOTE — DISCHARGE NOTE PROVIDER - NSDCFUADDINST_GEN_ALL_CORE_FT
The patient will be seen in the office between 2-3 weeks for wound check.   **Your first post-operative visit has been scheduled prior to your admission. PLEASE CONTACT OFFICE TO CONFIRM THE APPOINTMENT DATE. Tape will be removed at that time.  **  The silver based dressing is to be removed 7 days from the date of surgery (10/13).   ** CONTACT THE OFFICE IF THE FOLLOWING DEVELOP:  - the dressing becomes soiled or saturated  - you develop a fever greater that 101F  - the wound becomes red or you develop blistering around the wound  * Patient may shower after post-op day #3.   * The patient will continue home PT consistent with  total knee replacement protocol. Transition to outpatient PT will occur at the time of the first office visit.   * The patient will practice knee extension exercises regularly to minimize hamstring contraction.   * The patient is FULL weight bearing.  * The patient will continue ASPIRIN for 6 weeks after surgery for blood clot prevention.  * While on aspirin, the patient will take daily omeprazole or other similar medication to protect the stomach from irritation.   * The patient will take OXYCODONE AND TYLENOL for pain control and adjust according to prescription and patient needs. Contact the office if pain increases while taking prescribed pain medications or related concerns develop.  * Celebrex will be taken twice daily for 3 weeks for pain control and prevention of excessive bone growth. Additional prescription may be requested at your office follow-up visit.   * The patient will take Senna S while taking oxycodone to prevent narcotic associated constipation.  Additionally, increase water intake (drink at least 8 glasses of water daily) and try adding fiber to the diet by eating fruits, vegetables and foods that are rich in grains. If constipation is experienced, contact the medical/primary care provider to discuss further treatment options.  * To avoid injury at home:  - continue use of rolling walker until cleared by physical therapist  - have family or friend remove all throw rug or objects in hallways that may present a trip hazard.  - if you experience any dizziness or medical concerns, call your medical doctor or  911.  * The implant may activate metal detection devices.

## 2022-10-06 NOTE — CONSULT NOTE ADULT - NS ATTEND AMEND GEN_ALL_CORE FT
Patient seen and examined , s/p R TKA ,POD#0 ,   seen and examined on med/ surg floor .      Vital Signs Last 24 Hrs  T(C): 36.4 (06 Oct 2022 11:32), Max: 36.4 (06 Oct 2022 09:23)  T(F): 97.5 (06 Oct 2022 11:32), Max: 97.6 (06 Oct 2022 09:23)  HR: 86 (06 Oct 2022 11:32) (81 - 92)  BP: 112/70 (06 Oct 2022 11:32) (112/70 - 155/65)  BP(mean): 76 (06 Oct 2022 10:35) (71 - 84)  RR: 18 (06 Oct 2022 11:32) (15 - 19)  SpO2: 97% (06 Oct 2022 11:32) (95% - 100%)    O2 Parameters below as of 06 Oct 2022 11:32  Patient On (Oxygen Delivery Method): room air    Above noted and reviewed with NP   Prediabetes : preop A1C  6.0 , patient was not aware ,   now  informed , educated about diet , life style changes and need to follow up with PMD in 3 months to check A1C and follow further recommendations   dvt prophylaxis as per primary .   Thank you for the courtesy of this consult ,   will follow along with you .

## 2022-10-06 NOTE — PHYSICAL THERAPY INITIAL EVALUATION ADULT - NSPTDISCHREC_GEN_A_CORE
home with assist, RW and home PT pending progress, pending confirmation of family's ability/willingness to provide assistance for pt upon d/c home and pending stair assessment, as pt is an increased falls risk and has multiple stairs to negotiate at home.

## 2022-10-06 NOTE — DISCHARGE NOTE PROVIDER - CARE PROVIDER_API CALL
Jeffery Saxena)  Orthopaedic Surgery  [address], [address], [address]  [place] [address]  Phone: [phone]  Fax: [phone]  Follow Up Time:

## 2022-10-06 NOTE — DISCHARGE NOTE PROVIDER - NSDCMRMEDTOKEN_GEN_ALL_CORE_FT
aspirin 81 mg oral delayed release tablet: 1 tab(s) orally once a day  valsartan 160 mg oral capsule: orally 2 times a day   acetaminophen 325 mg oral tablet: 3 tab(s) orally every 8 hours PRN  aspirin 81 mg oral delayed release tablet: 1 tab(s) orally once a day - RESTART ON 11/17/22  celecoxib 200 mg oral capsule: 1 cap(s) orally every 12 hours x 3 weeks postop - BEGIN 10/8/22 - last dose on 10/28/22 MDD:2  Ecotrin 325 mg oral delayed release tablet: 1 tab(s) orally 2 times a day x 6 weeks - last dose on 11/16/22 MDD:2  omeprazole 20 mg oral delayed release capsule: 1 cap(s) orally once a day while taking Ecotrin 6 weeks postop - last dose on 11/16/22 MDD:1  oxyCODONE 5 mg oral tablet: 1 tab orally every 6 hours PRN mod-severe pain MDD:4  Senna S 50 mg-8.6 mg oral tablet: 2 tab(s) orally once a day (at bedtime), As Needed -for constipation MDD:2  valsartan 160 mg oral capsule: orally 2 times a day

## 2022-10-06 NOTE — DISCHARGE NOTE NURSING/CASE MANAGEMENT/SOCIAL WORK - PATIENT PORTAL LINK FT
You can access the FollowMyHealth Patient Portal offered by [facility] by registering at the following website: [email or web address]. By joining [email or web address]’s FollowMyHealth portal, you will also be able to view your health information using other applications (apps) compatible with our system.

## 2022-10-06 NOTE — PHYSICAL THERAPY INITIAL EVALUATION ADULT - GENERAL OBSERVATIONS, REHAB EVAL
Pt received on 2BRK, pt ok for PT by CADE Wade. pt observed semi-stockton in bed with IV lock, pleasant, cooperative, A&O and c/o 0/10 pain t/o eval.

## 2022-10-06 NOTE — DISCHARGE NOTE PROVIDER - NSDCFUSCHEDAPPT_GEN_ALL_CORE_FT
Jeffery Saxena  [facility]  ORTHOSURG [address]  Scheduled Appointment: 10/28/2022    Elebiary, Yeon J  [facility]  ORTHOSURG [address]  Scheduled Appointment: 11/29/2022    Jeffery Saxena  [facility]  ORTHOSURG [address]  Scheduled Appointment: 12/28/2022

## 2022-10-06 NOTE — CONSULT NOTE ADULT - ASSESSMENT
58 yo F PMH of HTN with c/o right knee pain. She has a known Hx of bone on bone medial compartmental osteoarthritis of the right knee. Her pain is exacerbated with walking and stairs. She had a cortisone injection which did not provide significant relief. She has constant achy cramping pain. She is taking Tylenol for pain and avoids NSAIDs. She is also having left knee pain, but it is not as severe as the right knee. She states the symptoms are worsening. She mentions her symptoms started years ago. Pain levels include a current pain level of 8/10, a minimum pain level of 3/10 and a maximum pain level of 9/10. She states the symptoms seem to be constant. Modifying factors: worsened by bending and walking. Relieving factors include acetaminophen and rest. S/P  Right TKR w/Dr Saxena  10/6/2022

## 2022-10-06 NOTE — DISCHARGE NOTE NURSING/CASE MANAGEMENT/SOCIAL WORK - NSDCPEFALRISK_GEN_ALL_CORE
For information on Fall & Injury Prevention, visit: [email or web address] OR  [email or web address] OR  https://www.cdc.gov/steadi/patient.html

## 2022-10-06 NOTE — CONSULT NOTE ADULT - SUBJECTIVE AND OBJECTIVE BOX
HPI:  58 yo F PMH of HTN with c/o right knee pain. She has a known Hx of bone on bone medial compartmental osteoarthritis of the right knee. Her pain is exacerbated with walking and stairs. She had a cortisone injection which did not provide significant relief. She has constant achy cramping pain. She is taking Tylenol for pain and avoids NSAIDs. She is also having left knee pain, but it is not as severe as the right knee. She states the symptoms are worsening. She mentions her symptoms started years ago. Pain levels include a current pain level of 8/10, a minimum pain level of 3/10 and a maximum pain level of 9/10. She states the symptoms seem to be constant. Modifying factors: worsened by bending and walking. Relieving factors include acetaminophen and rest. S/P  Right TKR w/Dr [place]  10/6/2022      PAST MEDICAL & SURGICAL HISTORY:  Unilateral primary osteoarthritis, right knee      Hypertension      No significant past surgical history          MEDICATIONS  (STANDING):  lactated ringers. 1000 milliLiter(s) (75 mL/Hr) IV Continuous <Continuous>    MEDICATIONS  (PRN):  fentaNYL    Injectable 25 MICROGram(s) IV Push every 5 minutes PRN Moderate Pain (4 - 6)  ondansetron Injectable 4 milliGRAM(s) IV Push once PRN Nausea and/or Vomiting      Allergies    No Known Allergies    Intolerances        SOCIAL HISTORY:  Never a smoker  Denies ETOH/IVDA    FAMILY HISTORY:  Family history of neurofibromatosis (Sibling)    FH: heart disease (Father)        Vital Signs Last 24 Hrs  T(C): 36.4 (06 Oct 2022 09:23), Max: 36.4 (06 Oct 2022 09:23)  T(F): 97.6 (06 Oct 2022 09:23), Max: 97.6 (06 Oct 2022 09:23)  HR: 84 (06 Oct 2022 09:35) (84 - 92)  BP: 128/64 (06 Oct 2022 09:35) (114/75 - 152/51)  BP(mean): 79 (06 Oct 2022 09:35) (71 - 84)  RR: 16 (06 Oct 2022 09:35) (16 - 16)  SpO2: 96% (06 Oct 2022 09:35) (96% - 97%)    Parameters below as of 06 Oct 2022 09:35  Patient On (Oxygen Delivery Method): nasal cannula  O2 Flow (L/min): 2      LABS:                  RADIOLOGY & ADDITIONAL STUDIES: HPI:  58 yo F PMH of HTN with c/o right knee pain. She has a known Hx of bone on bone medial compartmental osteoarthritis of the right knee. Her pain is exacerbated with walking and stairs. She had a cortisone injection which did not provide significant relief. She has constant achy cramping pain. She is taking Tylenol for pain and avoids NSAIDs. She is also having left knee pain, but it is not as severe as the right knee. She states the symptoms are worsening. She mentions her symptoms started years ago. Pain levels include a current pain level of 8/10, a minimum pain level of 3/10 and a maximum pain level of 9/10. She states the symptoms seem to be constant. Modifying factors: worsened by bending and walking. Relieving factors include acetaminophen and rest. S/P  Right TKR w/Dr Trevert  10/6/2022. Patient seen and examined in PACU, POD#0. Denies chest pain, SOB, cough,  dizziness. Denies RLE pain at present.       PAST MEDICAL & SURGICAL HISTORY:  Unilateral primary osteoarthritis, right knee      Hypertension      No significant past surgical history          MEDICATIONS  (STANDING):  lactated ringers. 1000 milliLiter(s) (75 mL/Hr) IV Continuous <Continuous>    MEDICATIONS  (PRN):  fentaNYL    Injectable 25 MICROGram(s) IV Push every 5 minutes PRN Moderate Pain (4 - 6)  ondansetron Injectable 4 milliGRAM(s) IV Push once PRN Nausea and/or Vomiting      Allergies    No Known Allergies    Intolerances        SOCIAL HISTORY:  Never a smoker  Denies ETOH/IVDA    FAMILY HISTORY:  Family history of neurofibromatosis (Sibling)    FH: heart disease (Father)        Vital Signs Last 24 Hrs  T(C): 36.4 (06 Oct 2022 09:23), Max: 36.4 (06 Oct 2022 09:23)  T(F): 97.6 (06 Oct 2022 09:23), Max: 97.6 (06 Oct 2022 09:23)  HR: 84 (06 Oct 2022 09:35) (84 - 92)  BP: 128/64 (06 Oct 2022 09:35) (114/75 - 152/51)  BP(mean): 79 (06 Oct 2022 09:35) (71 - 84)  RR: 16 (06 Oct 2022 09:35) (16 - 16)  SpO2: 96% (06 Oct 2022 09:35) (96% - 97%)    Parameters below as of 06 Oct 2022 09:35  Patient On (Oxygen Delivery Method): nasal cannula  O2 Flow (L/min): 2    ROS:  Constitutional, Eyes, ENT, Respiratory, Cardiovascular, Gastrointestinal, Genitourinary, Musculoskeletal, Neurological,  Integumentary, Psychiatric, Endocrine, Heme/Lymph are otherwise negative.    PHYSICAL EXAM:    General: Well developed; in no acute distress  Eyes: PERRLA, EOMI; conjunctiva and sclera clear  Head: Normocephalic; atraumatic  ENMT: No nasal discharge; airway clear  Neck: Supple; non tender; no JVD  Respiratory: No wheezes, rales or rhonchi  Cardiovascular: Regular rate and rhythm. S1 and S2 Normal; No murmurs, gallops or rubs  Gastrointestinal: Soft non-tender non-distended; Normal bowel sounds  Extremities: Right ACE wrap C/D/I. No clubbing, cyanosis or edema  Vascular: Peripheral pulses palpable 2+ bilaterally  Neurological: Alert and oriented x4  Skin: Warm and dry. No acute rash  Psychiatric: Cooperative and appropriate        LABS:                  RADIOLOGY & ADDITIONAL STUDIES: HPI:  56 yo F PMH of HTN with c/o right knee pain. She has a known Hx of bone on bone medial compartmental osteoarthritis of the right knee. Her pain is exacerbated with walking and stairs. She had a cortisone injection which did not provide significant relief. She has constant achy cramping pain. She is taking Tylenol for pain and avoids NSAIDs. She is also having left knee pain, but it is not as severe as the right knee. She states the symptoms are worsening. She mentions her symptoms started years ago. Pain levels include a current pain level of 8/10, a minimum pain level of 3/10 and a maximum pain level of 9/10. She states the symptoms seem to be constant. Modifying factors: worsened by bending and walking. Relieving factors include acetaminophen and rest. S/P  Right TKR w/Dr Trevert  10/6/2022. Patient seen and examined in PACU, POD#0. Denies chest pain, SOB, cough,  dizziness. Denies RLE pain at present.       PAST MEDICAL & SURGICAL HISTORY:  Unilateral primary osteoarthritis, right knee      Hypertension      No significant past surgical history          MEDICATIONS  (STANDING):  lactated ringers. 1000 milliLiter(s) (75 mL/Hr) IV Continuous <Continuous>    MEDICATIONS  (PRN):  fentaNYL    Injectable 25 MICROGram(s) IV Push every 5 minutes PRN Moderate Pain (4 - 6)  ondansetron Injectable 4 milliGRAM(s) IV Push once PRN Nausea and/or Vomiting      Allergies    No Known Allergies    Intolerances        SOCIAL HISTORY:  Never a smoker  Denies ETOH/IVDA    FAMILY HISTORY:  Family history of neurofibromatosis (Sibling)    FH: heart disease (Father)        Vital Signs Last 24 Hrs  T(C): 36.4 (06 Oct 2022 09:23), Max: 36.4 (06 Oct 2022 09:23)  T(F): 97.6 (06 Oct 2022 09:23), Max: 97.6 (06 Oct 2022 09:23)  HR: 84 (06 Oct 2022 09:35) (84 - 92)  BP: 128/64 (06 Oct 2022 09:35) (114/75 - 152/51)  BP(mean): 79 (06 Oct 2022 09:35) (71 - 84)  RR: 16 (06 Oct 2022 09:35) (16 - 16)  SpO2: 96% (06 Oct 2022 09:35) (96% - 97%)    Parameters below as of 06 Oct 2022 09:35  Patient On (Oxygen Delivery Method): nasal cannula  O2 Flow (L/min): 2    ROS:  Constitutional, Eyes, ENT, Respiratory, Cardiovascular, Gastrointestinal, Genitourinary,   Musculoskeletal, Neurological,  Integumentary, Psychiatric, Endocrine, Heme/Lymph are otherwise negative.    PHYSICAL EXAM:    General: Well developed; in no acute distress  Eyes: PERRLA, EOMI; conjunctiva and sclera clear  Head: Normocephalic; atraumatic  ENMT: No nasal discharge; airway clear  Neck: Supple; non tender; no JVD  Respiratory: No wheezes, rales or rhonchi  Cardiovascular: Regular rate and rhythm. S1 and S2 Normal; No murmurs, gallops or rubs  Gastrointestinal: Soft non-tender non-distended; Normal bowel sounds  Extremities: Right ACE wrap C/D/I. No clubbing, cyanosis or edema  Vascular: Peripheral pulses palpable 2+ bilaterally  Neurological: Alert and oriented x4  Skin: Warm and dry. No acute rash  Psychiatric: Cooperative and appropriate  A1C with Estimated Average Glucose (09.19.22 @ 17:50)    A1C with Estimated Average Glucose Result: 6.0 %    Estimated Average Glucose: 126 mg/dL          RADIOLOGY & ADDITIONAL STUDIES:    < from: Xray Knee 1 or 2 Views, Right (10.06.22 @ 11:45) >    ACC: [id number] EXAM:  XR KNEE 1-2 VIEWS RT                          PROCEDURE DATE:  10/06/2022          INTERPRETATION:  HISTORY: Postoperative  knee replacement.    TECHNIQUE: Two views of the RIGHT knee are submitted.    FINDINGS: Status post tricompartmental knee replacement with the femoral,   tibial and patellar components in anatomic alignment.  There is no fracture .    IMPRESSION:  Knee prosthetic components in proper anatomical alignment.    --- End of Report ---        < end of copied text >

## 2022-10-06 NOTE — PHYSICAL THERAPY INITIAL EVALUATION ADULT - ADDITIONAL COMMENTS
pt lives with son, dtr and 2 young grandsons (has another dtr and her parents can assist prn) in a private home with 1 ZONIA no handrails + 1 ZONIA no handrails down into bedroom. pt was independent all without an Assistive Device and (+) driving PTA. pt has RW and SAC

## 2022-10-06 NOTE — DISCHARGE NOTE PROVIDER - HOSPITAL COURSE
The patient underwent a RIGHT TOTAL KNEE REPLACEMENT on 10/6. The patient received antibiotics consistent with SCIP guidelines. The patient underwent the procedure and had no intra-operative complications. Post-operatively, the patient was seen by medicine and PT. The patient received ASPIRIN for DVTP. The patient received pain medications per orthopedic pain management pathway and the pain was appropriately controlled. The patient did not have any post-operative medical complications. The patient was discharged in stable condition.

## 2022-10-07 VITALS
RESPIRATION RATE: 17 BRPM | HEART RATE: 73 BPM | TEMPERATURE: 98 F | DIASTOLIC BLOOD PRESSURE: 87 MMHG | SYSTOLIC BLOOD PRESSURE: 134 MMHG | OXYGEN SATURATION: 97 %

## 2022-10-07 PROCEDURE — 73560 X-RAY EXAM OF KNEE 1 OR 2: CPT

## 2022-10-07 PROCEDURE — 36415 COLL VENOUS BLD VENIPUNCTURE: CPT

## 2022-10-07 PROCEDURE — 82962 GLUCOSE BLOOD TEST: CPT

## 2022-10-07 PROCEDURE — 97110 THERAPEUTIC EXERCISES: CPT

## 2022-10-07 PROCEDURE — C1776: CPT

## 2022-10-07 PROCEDURE — 99232 SBSQ HOSP IP/OBS MODERATE 35: CPT

## 2022-10-07 PROCEDURE — C1713: CPT

## 2022-10-07 PROCEDURE — 97116 GAIT TRAINING THERAPY: CPT

## 2022-10-07 RX ORDER — ASPIRIN/CALCIUM CARB/MAGNESIUM 324 MG
1 TABLET ORAL
Qty: 84 | Refills: 0
Start: 2022-10-07 | End: 2022-11-17

## 2022-10-07 RX ORDER — ASPIRIN/CALCIUM CARB/MAGNESIUM 324 MG
1 TABLET ORAL
Qty: 0 | Refills: 0 | DISCHARGE

## 2022-10-07 RX ORDER — OMEPRAZOLE 10 MG/1
1 CAPSULE, DELAYED RELEASE ORAL
Qty: 42 | Refills: 0
Start: 2022-10-07 | End: 2022-11-05

## 2022-10-07 RX ORDER — SENNOSIDES/DOCUSATE SODIUM 8.6MG-50MG
2 TABLET ORAL
Qty: 20 | Refills: 0
Start: 2022-10-07

## 2022-10-07 RX ORDER — CELECOXIB 200 MG/1
1 CAPSULE ORAL
Qty: 42 | Refills: 0
Start: 2022-10-07 | End: 2022-10-27

## 2022-10-07 RX ORDER — ACETAMINOPHEN 500 MG
3 TABLET ORAL
Qty: 0 | Refills: 0 | DISCHARGE
Start: 2022-10-07

## 2022-10-07 RX ORDER — OXYCODONE HYDROCHLORIDE 5 MG/1
1 TABLET ORAL
Qty: 28 | Refills: 0
Start: 2022-10-07 | End: 2022-10-13

## 2022-10-07 RX ADMIN — Medication 15 MILLIGRAM(S): at 06:45

## 2022-10-07 RX ADMIN — OXYCODONE HYDROCHLORIDE 10 MILLIGRAM(S): 5 TABLET ORAL at 15:27

## 2022-10-07 RX ADMIN — OXYCODONE HYDROCHLORIDE 10 MILLIGRAM(S): 5 TABLET ORAL at 09:16

## 2022-10-07 RX ADMIN — Medication 400 MILLIGRAM(S): at 06:45

## 2022-10-07 RX ADMIN — Medication 975 MILLIGRAM(S): at 15:26

## 2022-10-07 RX ADMIN — PANTOPRAZOLE SODIUM 40 MILLIGRAM(S): 20 TABLET, DELAYED RELEASE ORAL at 06:47

## 2022-10-07 RX ADMIN — Medication 15 MILLIGRAM(S): at 00:00

## 2022-10-07 RX ADMIN — Medication 1000 MILLIGRAM(S): at 07:00

## 2022-10-07 RX ADMIN — ONDANSETRON 4 MILLIGRAM(S): 8 TABLET, FILM COATED ORAL at 09:15

## 2022-10-07 RX ADMIN — OXYCODONE HYDROCHLORIDE 10 MILLIGRAM(S): 5 TABLET ORAL at 15:57

## 2022-10-07 RX ADMIN — OXYCODONE HYDROCHLORIDE 10 MILLIGRAM(S): 5 TABLET ORAL at 04:40

## 2022-10-07 RX ADMIN — OXYCODONE HYDROCHLORIDE 10 MILLIGRAM(S): 5 TABLET ORAL at 03:40

## 2022-10-07 RX ADMIN — Medication 325 MILLIGRAM(S): at 06:47

## 2022-10-07 RX ADMIN — Medication 15 MILLIGRAM(S): at 07:00

## 2022-10-07 RX ADMIN — Medication 975 MILLIGRAM(S): at 15:42

## 2022-10-07 RX ADMIN — OXYCODONE HYDROCHLORIDE 10 MILLIGRAM(S): 5 TABLET ORAL at 09:46

## 2022-10-07 NOTE — OCCUPATIONAL THERAPY INITIAL EVALUATION ADULT - ADDITIONAL COMMENTS
pt lives with son, dtr and 2 young grandsons (has another dtr and her parents can assist prn) in a private home with 1 ZONIA no handrails + 1 ZONIA no handrails down into bedroom. pt was independent all without an Assistive Device and (+) driving PTA. pt has RW and SAC, walk in shower

## 2022-10-07 NOTE — PROGRESS NOTE ADULT - SUBJECTIVE AND OBJECTIVE BOX
CC: right total knee arthroplasty      INTERVAL HPI/OVERNIGHT EVENTS: Patient seen and examined. No acute issues overnight. Pain controlled on current RX. Denies chest pain, SOB, dizziness, nausea, vomiting, fever, chills. Feels tired, but anticipates going home today.     Vital Signs Last 24 Hrs  T(C): 36.3 (07 Oct 2022 10:03), Max: 36.4 (06 Oct 2022 11:32)  T(F): 97.4 (07 Oct 2022 10:03), Max: 97.5 (06 Oct 2022 11:32)  HR: 60 (07 Oct 2022 10:03) (60 - 106)  BP: 146/80 (07 Oct 2022 10:03) (112/70 - 146/80)  BP(mean): --  RR: 18 (07 Oct 2022 10:03) (18 - 18)  SpO2: 98% (07 Oct 2022 10:03) (94% - 98%)    Parameters below as of 07 Oct 2022 10:03  Patient On (Oxygen Delivery Method): room air      PHYSICAL EXAM:    General: Well developed; in no acute distress  Eyes: PERRLA, EOMI; conjunctiva and sclera clear  Head: Normocephalic; atraumatic  ENMT: No nasal discharge; airway clear  Neck: Supple; non tender; no JVD  Respiratory: No wheezes, rales or rhonchi  Cardiovascular: Regular rate and rhythm. S1 and S2 Normal; No murmurs, gallops or rubs  Gastrointestinal: Soft non-tender non-distended; Normal bowel sounds  Extremities: Right ACE wrap C/D/I. No clubbing, cyanosis or edema  Vascular: Peripheral pulses palpable 2+ bilaterally  Neurological: Alert and oriented x4  Skin: Warm and dry. No acute rash  Psychiatric: Cooperative and appropriate        I&O's Detail    06 Oct 2022 07:01  -  07 Oct 2022 07:00  --------------------------------------------------------  IN:    sodium chloride 0.9%: 900 mL  Total IN: 900 mL    OUT:    Voided (mL): 2550 mL  Total OUT: 2550 mL    Total NET: -1650 mL                        CAPILLARY BLOOD GLUCOSE              MEDICATIONS  (STANDING):  acetaminophen     Tablet .. 975 milliGRAM(s) Oral every 8 hours  aspirin 325 milliGRAM(s) Oral two times a day  multivitamin 1 Tablet(s) Oral daily  pantoprazole    Tablet 40 milliGRAM(s) Oral before breakfast  polyethylene glycol 3350 17 Gram(s) Oral at bedtime  senna 2 Tablet(s) Oral at bedtime  sodium chloride 0.9%. 1000 milliLiter(s) (100 mL/Hr) IV Continuous <Continuous>    MEDICATIONS  (PRN):  acetaminophen     Tablet .. 650 milliGRAM(s) Oral every 6 hours PRN Temp greater or equal to 38C (100.4F)  HYDROmorphone   Tablet 4 milliGRAM(s) Oral every 4 hours PRN Severe Pain (7 - 10)  magnesium hydroxide Suspension 30 milliLiter(s) Oral daily PRN Constipation  ondansetron Injectable 4 milliGRAM(s) IV Push every 6 hours PRN Nausea and/or Vomiting  oxyCODONE    IR 5 milliGRAM(s) Oral every 3 hours PRN Mild Pain (1 - 3)  oxyCODONE    IR 10 milliGRAM(s) Oral every 3 hours PRN Moderate Pain (4 - 6)      RADIOLOGY & ADDITIONAL TESTS:  
Ortho Post Op Check    Name: MAIDA FRITZ  MR #: [id number]    Procedure: right total knee arthroplasty   Surgeon: Nett    Pt comfortable without complaints, pain controlled  Denies CP, SOB, N/V, numbness/tingling     General Exam:  Vital Signs Last 24 Hrs  T(C): 36.4 (10-06-22 @ 11:32), Max: 36.4 (10-06-22 @ 09:23)  T(F): 97.5 (10-06-22 @ 11:32), Max: 97.6 (10-06-22 @ 09:23)  HR: 86 (10-06-22 @ 11:32) (81 - 86)  BP: 112/70 (10-06-22 @ 11:32) (112/70 - 155/65)  BP(mean): 76 (10-06-22 @ 10:35) (71 - 84)  RR: 18 (10-06-22 @ 11:32) (15 - 19)  SpO2: 97% (10-06-22 @ 11:32) (95% - 100%)    General: Pt Alert and oriented, NAD, controlled pain.  ACE dressings C/D/I. No bleeding.  Pulses: 2+ dorsalis pedis pulse. Cap refill < 2 sec.  Sensation: Grossly intact to light touch without deficit.  Motor: + EHL/FHL/TA/GS          A/P: 57yFemale POD#0 s/p right total knee arthroplasty     - Pain Control  - DVT ppx:  BID  - Post op abx: as per SCIP  - PT/OT  - Weight bearing status: WBAT
MAIDA CATRINA    [id number]    History: Patient seen and eval at bedside. Patient c/o pain at operative site. The patient recently received pain medication. Denies nausea, vomiting, chest pain, shortness of breath, abdominal pain or fever.    Vital Signs Last 24 Hrs  T(C): 36.4 (06 Oct 2022 22:09), Max: 36.4 (06 Oct 2022 09:23)  T(F): 97.5 (06 Oct 2022 22:09), Max: 97.6 (06 Oct 2022 09:23)  HR: 106 (06 Oct 2022 22:09) (81 - 106)  BP: 118/73 (06 Oct 2022 22:09) (112/70 - 155/65)  BP(mean): 76 (06 Oct 2022 10:35) (71 - 84)  RR: 18 (06 Oct 2022 22:09) (15 - 19)  SpO2: 96% (06 Oct 2022 22:09) (94% - 100%)    PE: NAD, alert, awake  Right LE:   Knee dressing C/D/I, no drainage, no bleeding  EHL/TA/FHL/GS intact, DP pulse 2+  Gross sensation to LT intact distally s/s/DP/SP/tib distrib, Calf soft, NT B/L    Primary Orthopedic Assessment:  • s/p RIGHT total knee replacement POD#1    Plan:   • DVT prophylaxis as prescribed - ASA, including use of compression devices and ankle pumps  • Continue physical therapy  • Weightbearing as tolerated of the right lower extremity with assistance of a walker  • Incentive spirometry encouraged  Med following   Mild tachycardia likely secondary to pain, will monitor - D/W RN patient may need additional pain medication  • Pain control as clinically indicated  • Discharge planning: home today pending PT and med clearance

## 2022-10-07 NOTE — OCCUPATIONAL THERAPY INITIAL EVALUATION ADULT - GENERAL OBSERVATIONS, REHAB EVAL
pt received in bedside chair and left as found, no c/o pain, right LE ace wrapped, pt pleasant, motivated and following all commands

## 2022-10-07 NOTE — PROGRESS NOTE ADULT - NS ATTEND AMEND GEN_ALL_CORE FT
Patient seen and examined , s/p R TKA, POD #1,   pain well controlled , feels tiered , HD stable , no n/v ,   voiding without difficulty , participating with physical therapy .     Vital Signs Last 24 Hrs  T(C): 36.3 (07 Oct 2022 10:03), Max: 36.4 (06 Oct 2022 11:32)  T(F): 97.4 (07 Oct 2022 10:03), Max: 97.5 (06 Oct 2022 11:32)  HR: 60 (07 Oct 2022 10:03) (60 - 106)  BP: 146/80 (07 Oct 2022 10:03) (112/70 - 146/80)  RR: 18 (07 Oct 2022 10:03) (18 - 18)  SpO2: 98% (07 Oct 2022 10:03) (94% - 98%)    O2 Parameters below as of 07 Oct 2022 10:03  Patient On (Oxygen Delivery Method): room air    Lungs: CTA b/l   CVS: S1S2 no murmurs , no rubs  R knee dressing + , clean and dry     Above noted and reviewed with NP ,     Dispo plan is Home - likely today .    Medically stable to d/c once cleared by physical therapy / ortho .

## 2022-10-07 NOTE — OCCUPATIONAL THERAPY INITIAL EVALUATION ADULT - BATHING TRAINING, ASSISTIVE DEVICE, OT EVAL
educated on educated on compensatory bathing techniques and contacting lending closet for shower chair if needed

## 2022-10-07 NOTE — OCCUPATIONAL THERAPY INITIAL EVALUATION ADULT - LOWER BODY DRESSING, ASSISTIVE DEVICE, OT EVAL
pt able to lean forward in chair to manage clothing, educated on educated on compensatory dressing techniques,

## 2022-10-14 ENCOUNTER — NON-APPOINTMENT (OUTPATIENT)
Age: 57
End: 2022-10-14

## 2022-10-25 RX ORDER — OXYCODONE 5 MG/1
5 TABLET ORAL
Qty: 28 | Refills: 0 | Status: ACTIVE | COMMUNITY
Start: 2022-10-14 | End: 1900-01-01

## 2022-10-28 ENCOUNTER — APPOINTMENT (OUTPATIENT)
Dept: ORTHOPEDIC SURGERY | Facility: CLINIC | Age: 57
End: 2022-10-28

## 2022-10-28 VITALS
SYSTOLIC BLOOD PRESSURE: 159 MMHG | BODY MASS INDEX: 31.58 KG/M2 | DIASTOLIC BLOOD PRESSURE: 85 MMHG | HEIGHT: 64 IN | WEIGHT: 185 LBS | HEART RATE: 71 BPM

## 2022-10-28 PROCEDURE — 99024 POSTOP FOLLOW-UP VISIT: CPT

## 2022-10-28 PROCEDURE — 73562 X-RAY EXAM OF KNEE 3: CPT | Mod: RT

## 2022-10-28 NOTE — HISTORY OF PRESENT ILLNESS
[Clean/Dry/Intact] : clean, dry and intact [Healed] : healed [Swelling] : swollen [Neuro Intact] : an unremarkable neurological exam [Vascular Intact] : ~T peripheral vascular exam normal [Negative Zenon's] : maneuvers demonstrated a negative Zenon's sign [Xray (Date:___)] : [unfilled] Xray -  [Doing Well] : is doing well [No Sign of Infection] : is showing no signs of infection [3] : the patient reports pain that is 3/10 in severity [Adequate Pain Control] : has adequate pain control [Chills] : no chills [Constipation] : no constipation [Diarrhea] : no diarrhea [Dysuria] : no dysuria [Fever] : no fever [Nausea] : no nausea [Vomiting] : no vomiting [Erythema] : not erythematous [Discharge] : absent of discharge [Dehiscence] : not dehisced [de-identified] : s/p Right total knee arthroplasty.\par  Computerassisted tibial resection, OrthAlign procedure. DOS:10/06/22 [de-identified] : 58 y/o F pt is 3 weeks post op from right TKA. She complains of some pain. She also has some rashes on her face, arms, and legs. She went to an allergy doctor. She is taking Celebrex which may cause her rashes. She finished home PT. She is ambulates with a walker.   [de-identified] : Right knee exam shows healing incision with no sign of infection. ROM 0-90 [de-identified] : 3V xray of the right knee done in office today and reviewed by Dr. Jeffery Saxena demonstrates s/p right knee implants in good positioning with no evidence of wear, loosening, or subsidence.  [de-identified] : We recommend that she is off of Celebrex due to her allergic reaction of rashes. We sent a script for outpatient PT. She should continue to do low impact exercises. Pt understands the importance of prophylaxis for invasive dental procedures. Pt should continue taking aspirin BID for DVTP. Pt understands the importance of prophylaxis for invasive dental procedures. F/u with us in 3 weeks.

## 2022-11-29 ENCOUNTER — APPOINTMENT (OUTPATIENT)
Dept: ORTHOPEDIC SURGERY | Facility: CLINIC | Age: 57
End: 2022-11-29

## 2022-11-29 VITALS
HEIGHT: 64 IN | DIASTOLIC BLOOD PRESSURE: 85 MMHG | HEART RATE: 80 BPM | BODY MASS INDEX: 31.58 KG/M2 | WEIGHT: 185 LBS | SYSTOLIC BLOOD PRESSURE: 146 MMHG

## 2022-11-29 PROCEDURE — 73562 X-RAY EXAM OF KNEE 3: CPT | Mod: 26,RT

## 2022-11-29 PROCEDURE — 99024 POSTOP FOLLOW-UP VISIT: CPT

## 2022-11-29 RX ORDER — PREDNISONE 20 MG/1
20 TABLET ORAL
Qty: 10 | Refills: 0 | Status: ACTIVE | COMMUNITY
Start: 2022-10-20

## 2022-11-29 RX ORDER — EPINEPHRINE 0.3 MG/.3ML
0.3 INJECTION INTRAMUSCULAR
Qty: 2 | Refills: 0 | Status: ACTIVE | COMMUNITY
Start: 2022-10-20

## 2022-11-29 RX ORDER — DOCUSATE SODIUM 50 MG AND SENNOSIDES 8.6 MG 8.6; 5 MG/1; MG/1
8.6-5 TABLET, FILM COATED ORAL
Qty: 20 | Refills: 0 | Status: ACTIVE | COMMUNITY
Start: 2022-10-07

## 2022-11-29 RX ORDER — METOPROLOL SUCCINATE 50 MG/1
50 TABLET, EXTENDED RELEASE ORAL
Qty: 30 | Refills: 0 | Status: ACTIVE | COMMUNITY
Start: 2022-10-14

## 2022-11-29 RX ORDER — VALSARTAN 160 MG/1
160 TABLET, COATED ORAL
Qty: 60 | Refills: 0 | Status: ACTIVE | COMMUNITY
Start: 2022-10-12

## 2022-11-29 RX ORDER — DIPHENHYDRAMINE HYDROCHLORIDE 25 MG/1
25 CAPSULE ORAL
Qty: 30 | Refills: 0 | Status: ACTIVE | COMMUNITY
Start: 2022-10-20

## 2022-11-29 RX ORDER — METOPROLOL SUCCINATE 100 MG/1
100 TABLET, EXTENDED RELEASE ORAL
Qty: 30 | Refills: 0 | Status: ACTIVE | COMMUNITY
Start: 2022-11-20

## 2022-11-29 RX ORDER — AMLODIPINE BESYLATE 5 MG/1
5 TABLET ORAL
Qty: 30 | Refills: 0 | Status: ACTIVE | COMMUNITY
Start: 2022-11-07

## 2022-11-29 RX ORDER — ASPIRIN 325 MG/1
325 TABLET, COATED ORAL
Qty: 60 | Refills: 0 | Status: ACTIVE | COMMUNITY
Start: 2022-10-07

## 2022-11-29 NOTE — HISTORY OF PRESENT ILLNESS
[2] : the patient reports pain that is 2/10 in severity [Slow Progress] : is progressing slowly [No Sign of Infection] : is showing no signs of infection [Adequate Pain Control] : has adequate pain control [Chills] : no chills [Constipation] : no constipation [Diarrhea] : no diarrhea [Dysuria] : no dysuria [Fever] : no fever [Nausea] : no nausea [Vomiting] : no vomiting [de-identified] : s/p Right total knee arthroplasty.\par  Computer_assisted tibial resection, OrthAlign procedure. DOS:10/06/22. \par \par  [de-identified] : she is 7 weeks from right TKa\par taking oxycodone at night for pain.\par she walks with walker - feel unstable with cane\par she is in PT\par  [de-identified] : Right knee exam shows healing incision with no sign of infection. ROM 0-110. The surgical incision site(s) swollen, but clean, dry and intact, healed, not erythematous, absent of discharge and not dehisced. Additional findings included an unremarkable neurological exam, peripheral vascular exam normal and maneuvers demonstrated a negative Zenon's sign. \par  [de-identified] :  3V xray of the right knee done in office today and demonstrates s/p right knee implants in good positioning with no evidence of wear, loosening, or subsidence. \par  [de-identified] : I provided reneal of outpatient PT. She should continue to do low impact exercises. Pt understands the importance of prophylaxis for invasive dental procedures. f/u in 3 weeks

## 2022-12-19 RX ORDER — OXYCODONE 5 MG/1
5 TABLET ORAL
Qty: 14 | Refills: 0 | Status: ACTIVE | COMMUNITY
Start: 2022-11-21 | End: 1900-01-01

## 2022-12-28 ENCOUNTER — APPOINTMENT (OUTPATIENT)
Dept: ORTHOPEDIC SURGERY | Facility: CLINIC | Age: 57
End: 2022-12-28

## 2022-12-28 VITALS
HEIGHT: 64 IN | HEART RATE: 96 BPM | SYSTOLIC BLOOD PRESSURE: 137 MMHG | BODY MASS INDEX: 31.58 KG/M2 | DIASTOLIC BLOOD PRESSURE: 84 MMHG | WEIGHT: 185 LBS

## 2022-12-28 DIAGNOSIS — Z47.1 AFTERCARE FOLLOWING JOINT REPLACEMENT SURGERY: ICD-10-CM

## 2022-12-28 DIAGNOSIS — Z96.651 PRESENCE OF RIGHT ARTIFICIAL KNEE JOINT: ICD-10-CM

## 2022-12-28 DIAGNOSIS — Z96.651 AFTERCARE FOLLOWING JOINT REPLACEMENT SURGERY: ICD-10-CM

## 2022-12-28 PROCEDURE — 73562 X-RAY EXAM OF KNEE 3: CPT | Mod: RT

## 2022-12-28 PROCEDURE — 99024 POSTOP FOLLOW-UP VISIT: CPT

## 2022-12-28 NOTE — HISTORY OF PRESENT ILLNESS
[Clean/Dry/Intact] : clean, dry and intact [Healed] : healed [Swelling] : swollen [Neuro Intact] : an unremarkable neurological exam [Vascular Intact] : ~T peripheral vascular exam normal [Negative Zenon's] : maneuvers demonstrated a negative Zenon's sign [1] : the patient reports pain that is 1/10 in severity [Xray (Date:___)] : [unfilled] Xray -  [Doing Well] : is doing well [No Sign of Infection] : is showing no signs of infection [Adequate Pain Control] : has adequate pain control [Chills] : no chills [Constipation] : no constipation [Diarrhea] : no diarrhea [Dysuria] : no dysuria [Fever] : no fever [Nausea] : no nausea [Vomiting] : no vomiting [Erythema] : not erythematous [Discharge] : absent of discharge [Dehiscence] : not dehisced [de-identified] : s/p Right total knee arthroplasty.\par  Computer_assisted tibial resection, OrthAlign procedure. DOS:10/06/22. \par  [de-identified] : 58 y/o F pt is 11.5 weeks post op from right TKA. She is happy with the surgical outcome. She has some residual pain, but is tolerable. She is in PT and will continue PT till mid January. She has been consistency exercising.  [de-identified] : Right knee exam shows healed incision with no sign of infection. ROM 0-110 [de-identified] : 3V xray of the right knee done in office today and reviewed by Dr. Jeffery Saxena demonstrates s/p right knee implants in good positioning with no evidence of wear, loosening, or subsidence.  [de-identified] : She should continue to do low impact exercises. Pt understands the importance of prophylaxis for invasive dental procedures. F/u with us a year from surgery.

## 2023-01-26 RX ORDER — CELECOXIB 200 MG/1
200 CAPSULE ORAL TWICE DAILY
Qty: 60 | Refills: 0 | Status: ACTIVE | COMMUNITY
Start: 2023-01-26 | End: 1900-01-01

## 2023-01-31 RX ORDER — NAPROXEN SODIUM 550 MG/1
550 TABLET ORAL
Qty: 60 | Refills: 0 | Status: ACTIVE | COMMUNITY
Start: 2023-01-31 | End: 1900-01-01

## 2023-03-11 ENCOUNTER — OFFICE (OUTPATIENT)
Dept: URBAN - METROPOLITAN AREA CLINIC 103 | Facility: CLINIC | Age: 58
Setting detail: OPHTHALMOLOGY
End: 2023-03-11
Payer: COMMERCIAL

## 2023-03-11 DIAGNOSIS — H34.8312: ICD-10-CM

## 2023-03-11 DIAGNOSIS — H35.033: ICD-10-CM

## 2023-03-11 DIAGNOSIS — H02.831: ICD-10-CM

## 2023-03-11 DIAGNOSIS — H02.834: ICD-10-CM

## 2023-03-11 DIAGNOSIS — H25.13: ICD-10-CM

## 2023-03-11 DIAGNOSIS — H11.153: ICD-10-CM

## 2023-03-11 PROCEDURE — 92250 FUNDUS PHOTOGRAPHY W/I&R: CPT | Performed by: OPHTHALMOLOGY

## 2023-03-11 PROCEDURE — 92014 COMPRE OPH EXAM EST PT 1/>: CPT | Performed by: OPHTHALMOLOGY

## 2023-03-11 ASSESSMENT — REFRACTION_AUTOREFRACTION
OS_AXIS: 099
OD_SPHERE: +0.75
OS_SPHERE: +0.75
OS_CYLINDER: -0.50
OD_AXIS: 128
OD_CYLINDER: -0.50

## 2023-03-11 ASSESSMENT — TONOMETRY
OS_IOP_MMHG: 12
OD_IOP_MMHG: 18

## 2023-03-11 ASSESSMENT — VISUAL ACUITY
OD_BCVA: 20/30
OS_BCVA: 20/30

## 2023-03-11 ASSESSMENT — REFRACTION_MANIFEST
OD_SPHERE: +0.50
OS_VA1: 20/20-
OS_CYLINDER: SPHERE
OS_SPHERE: +0.50
OD_VA1: 20/20
OS_ADD: +2.00
OD_CYLINDER: SPHERE
OD_ADD: +2.00

## 2023-03-11 ASSESSMENT — AXIALLENGTH_DERIVED
OS_AL: 23.3087
OD_AL: 23.4894

## 2023-03-11 ASSESSMENT — KERATOMETRY
METHOD_AUTO_MANUAL: AUTO
OS_K1POWER_DIOPTERS: 43.50
OS_K2POWER_DIOPTERS: 44.00
OD_K2POWER_DIOPTERS: 43.50
OD_AXISANGLE_DEGREES: 063
OD_K1POWER_DIOPTERS: 43.00
OS_AXISANGLE_DEGREES: 050

## 2023-03-11 ASSESSMENT — SPHEQUIV_DERIVED
OS_SPHEQUIV: 0.5
OD_SPHEQUIV: 0.5

## 2023-03-11 ASSESSMENT — LID POSITION - DERMATOCHALASIS
OD_DERMATOCHALASIS: RUL 2+
OS_DERMATOCHALASIS: LUL 2+

## 2023-03-11 ASSESSMENT — CONFRONTATIONAL VISUAL FIELD TEST (CVF)
OD_FINDINGS: FULL
OS_FINDINGS: FULL

## 2024-03-20 ENCOUNTER — OFFICE (OUTPATIENT)
Dept: URBAN - METROPOLITAN AREA CLINIC 103 | Facility: CLINIC | Age: 59
Setting detail: OPHTHALMOLOGY
End: 2024-03-20
Payer: COMMERCIAL

## 2024-03-20 DIAGNOSIS — H35.033: ICD-10-CM

## 2024-03-20 DIAGNOSIS — H02.831: ICD-10-CM

## 2024-03-20 DIAGNOSIS — H11.153: ICD-10-CM

## 2024-03-20 DIAGNOSIS — H02.834: ICD-10-CM

## 2024-03-20 DIAGNOSIS — H25.13: ICD-10-CM

## 2024-03-20 PROCEDURE — 92250 FUNDUS PHOTOGRAPHY W/I&R: CPT | Performed by: OPHTHALMOLOGY

## 2024-03-20 PROCEDURE — 92014 COMPRE OPH EXAM EST PT 1/>: CPT | Performed by: OPHTHALMOLOGY

## 2024-03-20 ASSESSMENT — REFRACTION_MANIFEST
OD_SPHERE: +0.50
OD_ADD: +2.00
OS_ADD: +2.00
OS_SPHERE: +0.50
OD_CYLINDER: SPHERE
OS_VA1: 20/20-
OS_CYLINDER: SPHERE
OD_VA1: 20/20

## 2024-03-20 ASSESSMENT — LID POSITION - DERMATOCHALASIS
OD_DERMATOCHALASIS: RUL 2+
OS_DERMATOCHALASIS: LUL 2+

## 2024-09-20 ENCOUNTER — OFFICE (OUTPATIENT)
Dept: URBAN - METROPOLITAN AREA CLINIC 103 | Facility: CLINIC | Age: 59
Setting detail: OPHTHALMOLOGY
End: 2024-09-20
Payer: COMMERCIAL

## 2024-09-20 DIAGNOSIS — H25.13: ICD-10-CM

## 2024-09-20 DIAGNOSIS — H02.831: ICD-10-CM

## 2024-09-20 DIAGNOSIS — H02.834: ICD-10-CM

## 2024-09-20 DIAGNOSIS — H35.033: ICD-10-CM

## 2024-09-20 DIAGNOSIS — H11.153: ICD-10-CM

## 2024-09-20 PROCEDURE — 92012 INTRM OPH EXAM EST PATIENT: CPT | Performed by: OPHTHALMOLOGY

## 2024-09-20 ASSESSMENT — CONFRONTATIONAL VISUAL FIELD TEST (CVF)
OS_FINDINGS: FULL
OD_FINDINGS: FULL

## 2024-09-20 ASSESSMENT — LID POSITION - DERMATOCHALASIS
OD_DERMATOCHALASIS: RUL 2+
OS_DERMATOCHALASIS: LUL 2+

## 2025-01-23 ENCOUNTER — NON-APPOINTMENT (OUTPATIENT)
Age: 60
End: 2025-01-23

## 2025-01-23 PROBLEM — I10 ESSENTIAL (PRIMARY) HYPERTENSION: Chronic | Status: ACTIVE | Noted: 2022-09-19

## 2025-01-23 PROBLEM — M17.11 UNILATERAL PRIMARY OSTEOARTHRITIS, RIGHT KNEE: Chronic | Status: ACTIVE | Noted: 2022-09-19

## 2025-01-24 ENCOUNTER — APPOINTMENT (OUTPATIENT)
Dept: NEUROLOGY | Facility: CLINIC | Age: 60
End: 2025-01-24
Payer: MEDICAID

## 2025-01-24 VITALS
SYSTOLIC BLOOD PRESSURE: 137 MMHG | WEIGHT: 180 LBS | HEIGHT: 64 IN | BODY MASS INDEX: 30.73 KG/M2 | DIASTOLIC BLOOD PRESSURE: 84 MMHG

## 2025-01-24 DIAGNOSIS — I10 ESSENTIAL (PRIMARY) HYPERTENSION: ICD-10-CM

## 2025-01-24 DIAGNOSIS — G44.209 TENSION-TYPE HEADACHE, UNSPECIFIED, NOT INTRACTABLE: ICD-10-CM

## 2025-01-24 PROCEDURE — 99204 OFFICE O/P NEW MOD 45 MIN: CPT

## 2025-01-24 RX ORDER — METHYLPREDNISOLONE 4 MG/1
4 TABLET ORAL
Qty: 1 | Refills: 0 | Status: ACTIVE | COMMUNITY
Start: 2025-01-24 | End: 1900-01-01

## 2025-04-02 ENCOUNTER — NON-APPOINTMENT (OUTPATIENT)
Age: 60
End: 2025-04-02

## 2025-04-03 ENCOUNTER — NON-APPOINTMENT (OUTPATIENT)
Age: 60
End: 2025-04-03

## 2025-04-03 ENCOUNTER — APPOINTMENT (OUTPATIENT)
Dept: NEUROLOGY | Facility: CLINIC | Age: 60
End: 2025-04-03
Payer: MEDICAID

## 2025-04-03 VITALS
SYSTOLIC BLOOD PRESSURE: 135 MMHG | HEIGHT: 64 IN | BODY MASS INDEX: 30.73 KG/M2 | WEIGHT: 180 LBS | HEART RATE: 94 BPM | DIASTOLIC BLOOD PRESSURE: 84 MMHG

## 2025-04-03 DIAGNOSIS — G44.209 TENSION-TYPE HEADACHE, UNSPECIFIED, NOT INTRACTABLE: ICD-10-CM

## 2025-04-03 PROCEDURE — 99213 OFFICE O/P EST LOW 20 MIN: CPT

## 2025-04-19 ENCOUNTER — APPOINTMENT (OUTPATIENT)
Dept: MRI IMAGING | Facility: CLINIC | Age: 60
End: 2025-04-19

## 2025-04-19 ENCOUNTER — OUTPATIENT (OUTPATIENT)
Dept: OUTPATIENT SERVICES | Facility: HOSPITAL | Age: 60
LOS: 1 days | End: 2025-04-19
Payer: MEDICAID

## 2025-04-19 PROCEDURE — 72141 MRI NECK SPINE W/O DYE: CPT | Mod: 26

## 2025-04-19 PROCEDURE — 72141 MRI NECK SPINE W/O DYE: CPT

## 2025-05-02 ENCOUNTER — OUTPATIENT (OUTPATIENT)
Dept: OUTPATIENT SERVICES | Facility: HOSPITAL | Age: 60
LOS: 1 days | End: 2025-05-02
Payer: MEDICAID

## 2025-05-02 ENCOUNTER — APPOINTMENT (OUTPATIENT)
Dept: CT IMAGING | Facility: CLINIC | Age: 60
End: 2025-05-02

## 2025-05-02 DIAGNOSIS — Z00.8 ENCOUNTER FOR OTHER GENERAL EXAMINATION: ICD-10-CM

## 2025-05-02 PROCEDURE — 72125 CT NECK SPINE W/O DYE: CPT

## 2025-05-02 PROCEDURE — 72125 CT NECK SPINE W/O DYE: CPT | Mod: 26

## (undated) DEVICE — BLANKET WARMER UPPER ADULT

## (undated) DEVICE — SOL IRR POUR H2O 1000ML

## (undated) DEVICE — SUT MONOCRYL 3-0 27" PS-2 UNDYED

## (undated) DEVICE — SOL BETADINE POUCH 0.75OZ STERILE

## (undated) DEVICE — DEVICE ORTHALIGN PLUS

## (undated) DEVICE — DRAPE HALF SHEET 40X57"

## (undated) DEVICE — SUT VICRYL 0 18" CT-1 UNDYED

## (undated) DEVICE — BLADE ZIMMER PATELLA REAMER SZ 29

## (undated) DEVICE — SAW BLADE ZIMMER RECIPROCATING DOUBLE SIDED 12.5X96X1.19MM

## (undated) DEVICE — DRAPE 3/4 SHEET 52X76"

## (undated) DEVICE — DRAPE U LONG 47X70"

## (undated) DEVICE — DRAPE SHEET XL 77X98"

## (undated) DEVICE — ELCTR PENCIL NEPTUNE SMOKE EVACUATION

## (undated) DEVICE — SOL IRR POUR NS 0.9% 1000ML

## (undated) DEVICE — WRAP COMPRESSION CALF MED

## (undated) DEVICE — TAPE SILK 3"

## (undated) DEVICE — HOOD FLYTE STRYKER SURGICOOL W PEELAWAY

## (undated) DEVICE — BLADE SAGITTAL DUAL CUT 75X18X1.27MM

## (undated) DEVICE — PACK TOTAL KNEE

## (undated) DEVICE — SUT VICRYL 2-0 27" CT-2 UNDYED

## (undated) DEVICE — KT PULSAVAC WOUND W/ SPRAYTIP

## (undated) DEVICE — GLV 8 PROTEXIS

## (undated) DEVICE — SUT DERMABOND PRINEO 22CM

## (undated) DEVICE — NDL HYPO SAFE 20G X 1.5"

## (undated) DEVICE — ZIMMER BLUE CURETTE

## (undated) DEVICE — BLADE ZIMMER PATELLA REAMER W PILOT HOLE SZ 32

## (undated) DEVICE — ZIMMER MIXING BOWL

## (undated) DEVICE — SYR LUER LOK 50CC